# Patient Record
Sex: FEMALE | Race: WHITE | NOT HISPANIC OR LATINO
[De-identification: names, ages, dates, MRNs, and addresses within clinical notes are randomized per-mention and may not be internally consistent; named-entity substitution may affect disease eponyms.]

---

## 2017-02-23 ENCOUNTER — APPOINTMENT (OUTPATIENT)
Dept: FAMILY MEDICINE | Facility: CLINIC | Age: 82
End: 2017-02-23

## 2017-02-23 VITALS
SYSTOLIC BLOOD PRESSURE: 142 MMHG | HEIGHT: 61 IN | BODY MASS INDEX: 29.27 KG/M2 | WEIGHT: 155 LBS | DIASTOLIC BLOOD PRESSURE: 80 MMHG

## 2017-02-23 DIAGNOSIS — Z87.891 PERSONAL HISTORY OF NICOTINE DEPENDENCE: ICD-10-CM

## 2017-02-23 RX ORDER — MULTIVIT-MIN/FOLIC/VIT K/LYCOP 400-300MCG
25 MCG TABLET ORAL
Qty: 180 | Refills: 1 | Status: ACTIVE | COMMUNITY
Start: 2017-02-23

## 2017-02-23 RX ORDER — FEXOFENADINE HYDROCHLORIDE AND PSEUDOEPHEDRINE HYDROCHLORIDE 60; 120 MG/1; MG/1
60-120 TABLET, FILM COATED, EXTENDED RELEASE ORAL
Refills: 0 | Status: ACTIVE | COMMUNITY
Start: 2017-02-23

## 2017-02-24 LAB
ALBUMIN SERPL ELPH-MCNC: 4.3 G/DL
ALP BLD-CCNC: 99 U/L
ALT SERPL-CCNC: 17 U/L
ANION GAP SERPL CALC-SCNC: 18 MMOL/L
AST SERPL-CCNC: 18 U/L
BASOPHILS # BLD AUTO: 0.05 K/UL
BASOPHILS NFR BLD AUTO: 0.5 %
BILIRUB SERPL-MCNC: 0.3 MG/DL
BUN SERPL-MCNC: 14 MG/DL
CALCIUM SERPL-MCNC: 9.5 MG/DL
CHLORIDE SERPL-SCNC: 94 MMOL/L
CHOLEST SERPL-MCNC: 175 MG/DL
CHOLEST/HDLC SERPL: 3.3 RATIO
CO2 SERPL-SCNC: 23 MMOL/L
CREAT SERPL-MCNC: 1.18 MG/DL
EOSINOPHIL # BLD AUTO: 0.14 K/UL
EOSINOPHIL NFR BLD AUTO: 1.3 %
GLUCOSE SERPL-MCNC: 112 MG/DL
HCT VFR BLD CALC: 36.1 %
HDLC SERPL-MCNC: 53 MG/DL
HGB BLD-MCNC: 11.6 G/DL
IMM GRANULOCYTES NFR BLD AUTO: 0.2 %
LDLC SERPL CALC-MCNC: 87 MG/DL
LYMPHOCYTES # BLD AUTO: 2.31 K/UL
LYMPHOCYTES NFR BLD AUTO: 21.7 %
MAN DIFF?: NORMAL
MCHC RBC-ENTMCNC: 26.4 PG
MCHC RBC-ENTMCNC: 32.1 GM/DL
MCV RBC AUTO: 82 FL
MONOCYTES # BLD AUTO: 0.55 K/UL
MONOCYTES NFR BLD AUTO: 5.2 %
NEUTROPHILS # BLD AUTO: 7.56 K/UL
NEUTROPHILS NFR BLD AUTO: 71.1 %
PLATELET # BLD AUTO: 441 K/UL
POTASSIUM SERPL-SCNC: 4.2 MMOL/L
PROT SERPL-MCNC: 7 G/DL
RBC # BLD: 4.4 M/UL
RBC # FLD: 13.9 %
SODIUM SERPL-SCNC: 135 MMOL/L
TRIGL SERPL-MCNC: 176 MG/DL
TSH SERPL-ACNC: 2.62 UIU/ML
WBC # FLD AUTO: 10.63 K/UL

## 2017-03-02 ENCOUNTER — MEDICATION RENEWAL (OUTPATIENT)
Age: 82
End: 2017-03-02

## 2017-04-12 ENCOUNTER — APPOINTMENT (OUTPATIENT)
Dept: ORTHOPEDIC SURGERY | Facility: CLINIC | Age: 82
End: 2017-04-12

## 2017-05-22 ENCOUNTER — MEDICATION RENEWAL (OUTPATIENT)
Age: 82
End: 2017-05-22

## 2017-07-26 ENCOUNTER — APPOINTMENT (OUTPATIENT)
Dept: ORTHOPEDIC SURGERY | Facility: CLINIC | Age: 82
End: 2017-07-26

## 2017-07-26 VITALS — WEIGHT: 140 LBS | HEIGHT: 61 IN | BODY MASS INDEX: 26.43 KG/M2

## 2017-07-26 DIAGNOSIS — Z78.9 OTHER SPECIFIED HEALTH STATUS: ICD-10-CM

## 2017-07-26 DIAGNOSIS — Z87.39 PERSONAL HISTORY OF OTHER DISEASES OF THE MUSCULOSKELETAL SYSTEM AND CONNECTIVE TISSUE: ICD-10-CM

## 2017-07-26 DIAGNOSIS — M21.162 VARUS DEFORMITY, NOT ELSEWHERE CLASSIFIED, LEFT KNEE: ICD-10-CM

## 2017-07-26 DIAGNOSIS — Z82.61 FAMILY HISTORY OF ARTHRITIS: ICD-10-CM

## 2017-07-26 DIAGNOSIS — Z80.9 FAMILY HISTORY OF MALIGNANT NEOPLASM, UNSPECIFIED: ICD-10-CM

## 2017-07-26 DIAGNOSIS — Z86.39 PERSONAL HISTORY OF OTHER ENDOCRINE, NUTRITIONAL AND METABOLIC DISEASE: ICD-10-CM

## 2017-08-16 ENCOUNTER — OUTPATIENT (OUTPATIENT)
Dept: OUTPATIENT SERVICES | Facility: HOSPITAL | Age: 82
LOS: 1 days | Discharge: HOME | End: 2017-08-16
Payer: MEDICARE

## 2017-08-16 VITALS
DIASTOLIC BLOOD PRESSURE: 88 MMHG | TEMPERATURE: 98 F | SYSTOLIC BLOOD PRESSURE: 155 MMHG | HEIGHT: 61 IN | RESPIRATION RATE: 18 BRPM | HEART RATE: 81 BPM | OXYGEN SATURATION: 97 % | WEIGHT: 148.37 LBS

## 2017-08-16 DIAGNOSIS — M25.569 PAIN IN UNSPECIFIED KNEE: ICD-10-CM

## 2017-08-16 DIAGNOSIS — I10 ESSENTIAL (PRIMARY) HYPERTENSION: ICD-10-CM

## 2017-08-16 DIAGNOSIS — Z01.818 ENCOUNTER FOR OTHER PREPROCEDURAL EXAMINATION: ICD-10-CM

## 2017-08-16 DIAGNOSIS — M17.12 UNILATERAL PRIMARY OSTEOARTHRITIS, LEFT KNEE: ICD-10-CM

## 2017-08-16 LAB
ANION GAP SERPL CALC-SCNC: 10 MMOL/L — SIGNIFICANT CHANGE UP (ref 5–17)
APTT BLD: 28.9 SEC — SIGNIFICANT CHANGE UP (ref 27.5–37.4)
BASOPHILS # BLD AUTO: 0.1 K/UL — SIGNIFICANT CHANGE UP (ref 0–0.2)
BASOPHILS NFR BLD AUTO: 1.2 % — SIGNIFICANT CHANGE UP (ref 0–2)
BUN SERPL-MCNC: 21 MG/DL — SIGNIFICANT CHANGE UP (ref 7–23)
CALCIUM SERPL-MCNC: 9 MG/DL — SIGNIFICANT CHANGE UP (ref 8.5–10.1)
CHLORIDE SERPL-SCNC: 102 MMOL/L — SIGNIFICANT CHANGE UP (ref 96–108)
CO2 SERPL-SCNC: 26 MMOL/L — SIGNIFICANT CHANGE UP (ref 22–31)
CREAT SERPL-MCNC: 1.24 MG/DL — SIGNIFICANT CHANGE UP (ref 0.5–1.3)
EOSINOPHIL # BLD AUTO: 0.2 K/UL — SIGNIFICANT CHANGE UP (ref 0–0.5)
EOSINOPHIL NFR BLD AUTO: 3.1 % — SIGNIFICANT CHANGE UP (ref 0–6)
GLUCOSE SERPL-MCNC: 112 MG/DL — HIGH (ref 70–99)
HBA1C BLD-MCNC: 5.7 % — HIGH (ref 4–5.6)
HCT VFR BLD CALC: 37 % — SIGNIFICANT CHANGE UP (ref 34.5–45)
HGB BLD-MCNC: 12.1 G/DL — SIGNIFICANT CHANGE UP (ref 11.5–15.5)
INR BLD: 1.04 RATIO — SIGNIFICANT CHANGE UP (ref 0.88–1.16)
LYMPHOCYTES # BLD AUTO: 2.2 K/UL — SIGNIFICANT CHANGE UP (ref 1–3.3)
LYMPHOCYTES # BLD AUTO: 27.8 % — SIGNIFICANT CHANGE UP (ref 13–44)
MCHC RBC-ENTMCNC: 27.9 PG — SIGNIFICANT CHANGE UP (ref 27–34)
MCHC RBC-ENTMCNC: 32.8 GM/DL — SIGNIFICANT CHANGE UP (ref 32–36)
MCV RBC AUTO: 85.2 FL — SIGNIFICANT CHANGE UP (ref 80–100)
MONOCYTES # BLD AUTO: 0.4 K/UL — SIGNIFICANT CHANGE UP (ref 0–0.9)
MONOCYTES NFR BLD AUTO: 5.7 % — SIGNIFICANT CHANGE UP (ref 2–14)
MRSA PCR RESULT.: SIGNIFICANT CHANGE UP
NEUTROPHILS # BLD AUTO: 4.9 K/UL — SIGNIFICANT CHANGE UP (ref 1.8–7.4)
NEUTROPHILS NFR BLD AUTO: 62.2 % — SIGNIFICANT CHANGE UP (ref 43–77)
PLATELET # BLD AUTO: 325 K/UL — SIGNIFICANT CHANGE UP (ref 150–400)
POTASSIUM SERPL-MCNC: 3.7 MMOL/L — SIGNIFICANT CHANGE UP (ref 3.5–5.3)
POTASSIUM SERPL-SCNC: 3.7 MMOL/L — SIGNIFICANT CHANGE UP (ref 3.5–5.3)
PROTHROM AB SERPL-ACNC: 11.4 SEC — SIGNIFICANT CHANGE UP (ref 9.8–12.7)
RBC # BLD: 4.34 M/UL — SIGNIFICANT CHANGE UP (ref 3.8–5.2)
RBC # FLD: 12.4 % — SIGNIFICANT CHANGE UP (ref 11–15)
S AUREUS DNA NOSE QL NAA+PROBE: SIGNIFICANT CHANGE UP
SODIUM SERPL-SCNC: 138 MMOL/L — SIGNIFICANT CHANGE UP (ref 135–145)
WBC # BLD: 7.9 K/UL — SIGNIFICANT CHANGE UP (ref 3.8–10.5)
WBC # FLD AUTO: 7.9 K/UL — SIGNIFICANT CHANGE UP (ref 3.8–10.5)

## 2017-08-16 PROCEDURE — 93010 ELECTROCARDIOGRAM REPORT: CPT | Mod: NC

## 2017-08-16 NOTE — PHYSICAL THERAPY INITIAL EVALUATION ADULT - MODIFIED CLINICAL TEST OF SENSORY INTEGRATION IN BALANCE TEST
5X Sit To Stand Test: 12 seconds with quad cane, 2MWT: 205 Feet, Barthel Index: Feeding Score _10__, Bathing Score _5__, Grooming Score _5__, Dressing Score _10__, Bowels Score _10__, Bladder Score _10__, Toilet Score __10_, Transfers Score __15_, Mobility Score _15__, Stairs Score _10__,     Total Score _100__

## 2017-08-16 NOTE — PHYSICAL THERAPY INITIAL EVALUATION ADULT - CRITERIA FOR SKILLED THERAPEUTIC INTERVENTIONS
predicted duration of therapy intervention/Home with Home P.T./rehab potential/impairments found/therapy frequency/anticipated equipment needs at discharge/functional limitations in following categories/anticipated discharge recommendation/risk reduction/prevention

## 2017-08-16 NOTE — H&P PST ADULT - VISION (WITH CORRECTIVE LENSES IF THE PATIENT USUALLY WEARS THEM):
Use Glasses/Partially impaired: cannot see medication labels or newsprint, but can see obstacles in path, and the surrounding layout; can count fingers at arm's length

## 2017-08-16 NOTE — PHYSICAL THERAPY INITIAL EVALUATION ADULT - ADDITIONAL COMMENTS
Patient utilizes a narrow base quad cane for home bound ambulation and for community ambulation patient utilizes a rolling walker.

## 2017-08-16 NOTE — PHYSICAL THERAPY INITIAL EVALUATION ADULT - LIVES WITH, PROFILE
Private house with 1 platform step to enter the home and 17 steps to enter with L handrail to enter the second level where patient resides./children

## 2017-08-16 NOTE — OCCUPATIONAL THERAPY INITIAL EVALUATION ADULT - GENERAL OBSERVATIONS, REHAB EVAL
Chart reviewed. Patient encountered seated in recliner chair in [facility] waiting area with NAD. Patient underwent occupational therapy pre-operative consultation to determine current functional ADL limitations in order to provide the right equipment for patient to perform functional ADLS post operation.

## 2017-08-16 NOTE — OCCUPATIONAL THERAPY INITIAL EVALUATION ADULT - ADDITIONAL COMMENTS
Patient lives in a 2 family home with 2 steps with no railing to enter. Once inside, the patient has 5 steps with a railing to a landing, then another 5 steps with a railing to another landing, then another 5 steps with a railing to the main floor where the bedroom and bathroom is. The patient has a regular bathroom with a low toilet and a shower stall with no devices or equipment inside. The patient ambulates with a rolling walker inside the house and outside of the house with a quad cane. The patient is able to state wants and needs at this time.

## 2017-08-16 NOTE — H&P PST ADULT - HISTORY OF PRESENT ILLNESS
82 yo female c/o bilateral knee pains secondary to osteoarthritis- left is worse- scheduled for left knee arthroplasty

## 2017-08-16 NOTE — H&P PST ADULT - NSANTHOSAYNRD_GEN_A_CORE
No. COLE screening performed.  STOP BANG Legend: 0-2 = LOW Risk; 3-4 = INTERMEDIATE Risk; 5-8 = HIGH Risk/denies

## 2017-08-16 NOTE — OCCUPATIONAL THERAPY INITIAL EVALUATION ADULT - MODIFIED CLINICAL TEST OF SENSORY INTEGRATION IN BALANCE TEST
Barthel Index: Feeding Score___10___, Bathing Score___5___, Grooming Score__5___, Dressing Score__10___, Bowel Score__10___, Bladder Score__10____, Toilet Score__10___, Transfer Score___15___, Mobility Score___15__, Stairs Score__10___, Total Score__100___.

## 2017-08-16 NOTE — OCCUPATIONAL THERAPY INITIAL EVALUATION ADULT - SOCIAL CONCERNS
As per patient " My son in law will be home and he will be able to help me with any of my daily tasks and needs." "I don't have any barriers in my home as well"./None

## 2017-08-16 NOTE — OCCUPATIONAL THERAPY INITIAL EVALUATION ADULT - FINE MOTOR COORDINATION, FINE MOTOR COORDINATION TESTS, OT EVAL
Patient specific functional scale: Scoring scheme for test: 0 (unable to perform activity) ---- 10 (Able to perform activity at the same level as before injury or problem ). Activity: Walking____4______, Cleaning___4___, Stairs____4____

## 2017-08-16 NOTE — PATIENT PROFILE ADULT. - VISION (WITH CORRECTIVE LENSES IF THE PATIENT USUALLY WEARS THEM):
Partially impaired: cannot see medication labels or newsprint, but can see obstacles in path, and the surrounding layout; can count fingers at arm's length/Use Glasses

## 2017-08-21 ENCOUNTER — APPOINTMENT (OUTPATIENT)
Dept: FAMILY MEDICINE | Facility: CLINIC | Age: 82
End: 2017-08-21
Payer: MEDICARE

## 2017-08-21 ENCOUNTER — MEDICATION RENEWAL (OUTPATIENT)
Age: 82
End: 2017-08-21

## 2017-08-21 VITALS
WEIGHT: 140 LBS | HEIGHT: 61 IN | BODY MASS INDEX: 26.43 KG/M2 | SYSTOLIC BLOOD PRESSURE: 130 MMHG | DIASTOLIC BLOOD PRESSURE: 80 MMHG

## 2017-08-21 DIAGNOSIS — M25.562 PAIN IN RIGHT KNEE: ICD-10-CM

## 2017-08-21 DIAGNOSIS — M25.561 PAIN IN RIGHT KNEE: ICD-10-CM

## 2017-08-21 PROCEDURE — 99214 OFFICE O/P EST MOD 30 MIN: CPT

## 2017-08-29 RX ORDER — SODIUM CHLORIDE 9 MG/ML
3 INJECTION INTRAMUSCULAR; INTRAVENOUS; SUBCUTANEOUS EVERY 8 HOURS
Qty: 0 | Refills: 0 | Status: DISCONTINUED | OUTPATIENT
Start: 2017-08-31 | End: 2017-09-03

## 2017-08-30 RX ORDER — ONDANSETRON 8 MG/1
8 TABLET, FILM COATED ORAL EVERY 6 HOURS
Qty: 0 | Refills: 0 | Status: DISCONTINUED | OUTPATIENT
Start: 2017-08-31 | End: 2017-09-03

## 2017-08-30 RX ORDER — OXYCODONE HYDROCHLORIDE 5 MG/1
10 TABLET ORAL ONCE
Qty: 0 | Refills: 0 | Status: DISCONTINUED | OUTPATIENT
Start: 2017-08-31 | End: 2017-08-31

## 2017-08-30 RX ORDER — CELECOXIB 200 MG/1
200 CAPSULE ORAL ONCE
Qty: 0 | Refills: 0 | Status: COMPLETED | OUTPATIENT
Start: 2017-08-31 | End: 2017-09-01

## 2017-08-30 RX ORDER — LORATADINE 10 MG/1
10 TABLET ORAL DAILY
Qty: 0 | Refills: 0 | Status: DISCONTINUED | OUTPATIENT
Start: 2017-08-31 | End: 2017-09-03

## 2017-08-30 RX ORDER — MAGNESIUM HYDROXIDE 400 MG/1
30 TABLET, CHEWABLE ORAL DAILY
Qty: 0 | Refills: 0 | Status: DISCONTINUED | OUTPATIENT
Start: 2017-08-31 | End: 2017-09-03

## 2017-08-30 RX ORDER — FERROUS SULFATE 325(65) MG
325 TABLET ORAL
Qty: 0 | Refills: 0 | Status: DISCONTINUED | OUTPATIENT
Start: 2017-08-31 | End: 2017-09-03

## 2017-08-30 RX ORDER — METOCLOPRAMIDE HCL 10 MG
10 TABLET ORAL EVERY 6 HOURS
Qty: 0 | Refills: 0 | Status: DISCONTINUED | OUTPATIENT
Start: 2017-08-31 | End: 2017-09-03

## 2017-08-30 RX ORDER — ENOXAPARIN SODIUM 100 MG/ML
40 INJECTION SUBCUTANEOUS EVERY 24 HOURS
Qty: 0 | Refills: 0 | Status: DISCONTINUED | OUTPATIENT
Start: 2017-09-01 | End: 2017-09-03

## 2017-08-30 RX ORDER — HYDROMORPHONE HYDROCHLORIDE 2 MG/ML
0.5 INJECTION INTRAMUSCULAR; INTRAVENOUS; SUBCUTANEOUS EVERY 4 HOURS
Qty: 0 | Refills: 0 | Status: DISCONTINUED | OUTPATIENT
Start: 2017-08-31 | End: 2017-09-03

## 2017-08-30 RX ORDER — OXYCODONE HYDROCHLORIDE 5 MG/1
10 TABLET ORAL EVERY 4 HOURS
Qty: 0 | Refills: 0 | Status: DISCONTINUED | OUTPATIENT
Start: 2017-08-31 | End: 2017-09-03

## 2017-08-30 RX ORDER — OXYCODONE HYDROCHLORIDE 5 MG/1
5 TABLET ORAL EVERY 4 HOURS
Qty: 0 | Refills: 0 | Status: DISCONTINUED | OUTPATIENT
Start: 2017-08-31 | End: 2017-09-03

## 2017-08-30 RX ORDER — LOSARTAN POTASSIUM 100 MG/1
100 TABLET, FILM COATED ORAL DAILY
Qty: 0 | Refills: 0 | Status: DISCONTINUED | OUTPATIENT
Start: 2017-08-31 | End: 2017-09-03

## 2017-08-30 RX ORDER — HYDROCHLOROTHIAZIDE 25 MG
25 TABLET ORAL DAILY
Qty: 0 | Refills: 0 | Status: DISCONTINUED | OUTPATIENT
Start: 2017-09-01 | End: 2017-09-03

## 2017-08-30 RX ORDER — ACETAMINOPHEN 500 MG
650 TABLET ORAL ONCE
Qty: 0 | Refills: 0 | Status: DISCONTINUED | OUTPATIENT
Start: 2017-08-31 | End: 2017-09-03

## 2017-08-30 RX ORDER — SODIUM CHLORIDE 9 MG/ML
80 INJECTION, SOLUTION INTRAVENOUS
Qty: 0 | Refills: 0 | Status: DISCONTINUED | OUTPATIENT
Start: 2017-08-31 | End: 2017-09-01

## 2017-08-30 RX ORDER — ACETAMINOPHEN 500 MG
650 TABLET ORAL EVERY 6 HOURS
Qty: 0 | Refills: 0 | Status: DISCONTINUED | OUTPATIENT
Start: 2017-08-31 | End: 2017-09-03

## 2017-08-30 RX ORDER — DOCUSATE SODIUM 100 MG
100 CAPSULE ORAL THREE TIMES A DAY
Qty: 0 | Refills: 0 | Status: DISCONTINUED | OUTPATIENT
Start: 2017-08-31 | End: 2017-09-03

## 2017-08-30 RX ORDER — SENNA PLUS 8.6 MG/1
2 TABLET ORAL AT BEDTIME
Qty: 0 | Refills: 0 | Status: DISCONTINUED | OUTPATIENT
Start: 2017-08-31 | End: 2017-09-03

## 2017-08-31 ENCOUNTER — RESULT REVIEW (OUTPATIENT)
Age: 82
End: 2017-08-31

## 2017-08-31 ENCOUNTER — APPOINTMENT (OUTPATIENT)
Dept: ORTHOPEDIC SURGERY | Facility: HOSPITAL | Age: 82
End: 2017-08-31

## 2017-08-31 ENCOUNTER — INPATIENT (INPATIENT)
Facility: HOSPITAL | Age: 82
LOS: 2 days | Discharge: HOME HEALTH | End: 2017-09-03
Attending: ORTHOPAEDIC SURGERY | Admitting: ORTHOPAEDIC SURGERY
Payer: MEDICARE

## 2017-08-31 VITALS
HEART RATE: 71 BPM | SYSTOLIC BLOOD PRESSURE: 129 MMHG | RESPIRATION RATE: 18 BRPM | OXYGEN SATURATION: 96 % | WEIGHT: 145.06 LBS | TEMPERATURE: 98 F | HEIGHT: 61 IN | DIASTOLIC BLOOD PRESSURE: 76 MMHG

## 2017-08-31 LAB
ANION GAP SERPL CALC-SCNC: 11 MMOL/L — SIGNIFICANT CHANGE UP (ref 5–17)
BUN SERPL-MCNC: 22 MG/DL — SIGNIFICANT CHANGE UP (ref 7–23)
CALCIUM SERPL-MCNC: 9 MG/DL — SIGNIFICANT CHANGE UP (ref 8.5–10.1)
CHLORIDE SERPL-SCNC: 103 MMOL/L — SIGNIFICANT CHANGE UP (ref 96–108)
CO2 SERPL-SCNC: 24 MMOL/L — SIGNIFICANT CHANGE UP (ref 22–31)
CREAT SERPL-MCNC: 1.5 MG/DL — HIGH (ref 0.5–1.3)
GLUCOSE SERPL-MCNC: 143 MG/DL — HIGH (ref 70–99)
HCT VFR BLD CALC: 36 % — SIGNIFICANT CHANGE UP (ref 34.5–45)
HGB BLD-MCNC: 11.7 G/DL — SIGNIFICANT CHANGE UP (ref 11.5–15.5)
INR BLD: 1.08 RATIO — SIGNIFICANT CHANGE UP (ref 0.88–1.16)
MCHC RBC-ENTMCNC: 28.4 PG — SIGNIFICANT CHANGE UP (ref 27–34)
MCHC RBC-ENTMCNC: 32.5 GM/DL — SIGNIFICANT CHANGE UP (ref 32–36)
MCV RBC AUTO: 87.2 FL — SIGNIFICANT CHANGE UP (ref 80–100)
PLATELET # BLD AUTO: 348 K/UL — SIGNIFICANT CHANGE UP (ref 150–400)
POTASSIUM SERPL-MCNC: 4 MMOL/L — SIGNIFICANT CHANGE UP (ref 3.5–5.3)
POTASSIUM SERPL-SCNC: 4 MMOL/L — SIGNIFICANT CHANGE UP (ref 3.5–5.3)
PROTHROM AB SERPL-ACNC: 11.8 SEC — SIGNIFICANT CHANGE UP (ref 9.8–12.7)
RBC # BLD: 4.13 M/UL — SIGNIFICANT CHANGE UP (ref 3.8–5.2)
RBC # FLD: 13.4 % — SIGNIFICANT CHANGE UP (ref 11–15)
SODIUM SERPL-SCNC: 138 MMOL/L — SIGNIFICANT CHANGE UP (ref 135–145)
WBC # BLD: 13.2 K/UL — HIGH (ref 3.8–10.5)
WBC # FLD AUTO: 13.2 K/UL — HIGH (ref 3.8–10.5)

## 2017-08-31 PROCEDURE — 88305 TISSUE EXAM BY PATHOLOGIST: CPT | Mod: 26

## 2017-08-31 PROCEDURE — 88311 DECALCIFY TISSUE: CPT | Mod: 26

## 2017-08-31 PROCEDURE — 27447 TOTAL KNEE ARTHROPLASTY: CPT | Mod: LT

## 2017-08-31 PROCEDURE — 73560 X-RAY EXAM OF KNEE 1 OR 2: CPT | Mod: 26,LT

## 2017-08-31 RX ORDER — CEFAZOLIN SODIUM 1 G
2000 VIAL (EA) INJECTION EVERY 8 HOURS
Qty: 0 | Refills: 0 | Status: COMPLETED | OUTPATIENT
Start: 2017-08-31 | End: 2017-08-31

## 2017-08-31 RX ORDER — ACETAMINOPHEN 500 MG
1000 TABLET ORAL ONCE
Qty: 0 | Refills: 0 | Status: COMPLETED | OUTPATIENT
Start: 2017-08-31 | End: 2017-09-01

## 2017-08-31 RX ORDER — ONDANSETRON 8 MG/1
4 TABLET, FILM COATED ORAL ONCE
Qty: 0 | Refills: 0 | Status: DISCONTINUED | OUTPATIENT
Start: 2017-08-31 | End: 2017-08-31

## 2017-08-31 RX ORDER — HYDROMORPHONE HYDROCHLORIDE 2 MG/ML
0.5 INJECTION INTRAMUSCULAR; INTRAVENOUS; SUBCUTANEOUS
Qty: 0 | Refills: 0 | Status: DISCONTINUED | OUTPATIENT
Start: 2017-08-31 | End: 2017-08-31

## 2017-08-31 RX ORDER — SODIUM CHLORIDE 9 MG/ML
1000 INJECTION, SOLUTION INTRAVENOUS
Qty: 0 | Refills: 0 | Status: DISCONTINUED | OUTPATIENT
Start: 2017-08-31 | End: 2017-08-31

## 2017-08-31 RX ORDER — ACETAMINOPHEN 500 MG
1000 TABLET ORAL ONCE
Qty: 0 | Refills: 0 | Status: COMPLETED | OUTPATIENT
Start: 2017-08-31 | End: 2017-08-31

## 2017-08-31 RX ADMIN — Medication 400 MILLIGRAM(S): at 14:14

## 2017-08-31 RX ADMIN — Medication 1000 MILLIGRAM(S): at 15:15

## 2017-08-31 RX ADMIN — Medication 100 MILLIGRAM(S): at 17:53

## 2017-08-31 RX ADMIN — Medication 100 MILLIGRAM(S): at 22:42

## 2017-08-31 RX ADMIN — SODIUM CHLORIDE 3 MILLILITER(S): 9 INJECTION INTRAMUSCULAR; INTRAVENOUS; SUBCUTANEOUS at 22:41

## 2017-08-31 RX ADMIN — Medication 325 MILLIGRAM(S): at 17:33

## 2017-08-31 RX ADMIN — HYDROMORPHONE HYDROCHLORIDE 0.5 MILLIGRAM(S): 2 INJECTION INTRAMUSCULAR; INTRAVENOUS; SUBCUTANEOUS at 17:46

## 2017-08-31 RX ADMIN — HYDROMORPHONE HYDROCHLORIDE 0.5 MILLIGRAM(S): 2 INJECTION INTRAMUSCULAR; INTRAVENOUS; SUBCUTANEOUS at 17:28

## 2017-08-31 RX ADMIN — OXYCODONE HYDROCHLORIDE 10 MILLIGRAM(S): 5 TABLET ORAL at 09:36

## 2017-08-31 RX ADMIN — SODIUM CHLORIDE 75 MILLILITER(S): 9 INJECTION, SOLUTION INTRAVENOUS at 14:15

## 2017-08-31 NOTE — PHYSICAL THERAPY INITIAL EVALUATION ADULT - GENERAL OBSERVATIONS, REHAB EVAL
Pt. found supine, awake with dressing  left knee, ace wrap and hemovac, c/o pain and tightness L knee.

## 2017-08-31 NOTE — PHYSICAL THERAPY INITIAL EVALUATION ADULT - IMPAIRMENTS FOUND, PT EVAL
muscle strength/gait, locomotion, and balance/aerobic capacity/endurance/ergonomics and body mechanics

## 2017-08-31 NOTE — PHYSICAL THERAPY INITIAL EVALUATION ADULT - CRITERIA FOR SKILLED THERAPEUTIC INTERVENTIONS
functional limitations in following categories/risk reduction/prevention/anticipated equipment needs at discharge/impairments found

## 2017-08-31 NOTE — PROGRESS NOTE ADULT - SUBJECTIVE AND OBJECTIVE BOX
Post Operative Check    82y Female s/p L TKA Seen in PACU  Pain controlled  Unable to assess Sensation/Motor 2/2 block, will reassess  Dressing C/D/I  No calf ttp  Drain in place     Vital Signs Last 24 Hrs  T(C): 36.3 (31 Aug 2017 13:22), Max: 36.7 (31 Aug 2017 08:04)  T(F): 97.3 (31 Aug 2017 13:22), Max: 98 (31 Aug 2017 08:04)  HR: 81 (31 Aug 2017 13:52) (71 - 88)  BP: 113/71 (31 Aug 2017 13:52) (113/71 - 133/55)  BP(mean): --  RR: 20 (31 Aug 2017 13:52) (14 - 20)  SpO2: 95% (31 Aug 2017 13:52) (95% - 100%)    WBAT, FU Labs, Tx to floor

## 2017-09-01 ENCOUNTER — TRANSCRIPTION ENCOUNTER (OUTPATIENT)
Age: 82
End: 2017-09-01

## 2017-09-01 LAB
ALBUMIN SERPL ELPH-MCNC: 3.5 G/DL — SIGNIFICANT CHANGE UP (ref 3.3–5)
ALP SERPL-CCNC: 97 U/L — SIGNIFICANT CHANGE UP (ref 40–120)
ALT FLD-CCNC: 14 U/L — SIGNIFICANT CHANGE UP (ref 12–78)
ANION GAP SERPL CALC-SCNC: 13 MMOL/L — SIGNIFICANT CHANGE UP (ref 5–17)
AST SERPL-CCNC: 22 U/L — SIGNIFICANT CHANGE UP (ref 15–37)
BILIRUB SERPL-MCNC: 0.5 MG/DL — SIGNIFICANT CHANGE UP (ref 0.2–1.2)
BUN SERPL-MCNC: 17 MG/DL — SIGNIFICANT CHANGE UP (ref 7–23)
CALCIUM SERPL-MCNC: 8.8 MG/DL — SIGNIFICANT CHANGE UP (ref 8.5–10.1)
CHLORIDE SERPL-SCNC: 99 MMOL/L — SIGNIFICANT CHANGE UP (ref 96–108)
CO2 SERPL-SCNC: 24 MMOL/L — SIGNIFICANT CHANGE UP (ref 22–31)
CREAT SERPL-MCNC: 1.18 MG/DL — SIGNIFICANT CHANGE UP (ref 0.5–1.3)
GLUCOSE SERPL-MCNC: 137 MG/DL — HIGH (ref 70–99)
HCT VFR BLD CALC: 33.5 % — LOW (ref 34.5–45)
HGB BLD-MCNC: 11.4 G/DL — LOW (ref 11.5–15.5)
MCHC RBC-ENTMCNC: 28.6 PG — SIGNIFICANT CHANGE UP (ref 27–34)
MCHC RBC-ENTMCNC: 33.9 GM/DL — SIGNIFICANT CHANGE UP (ref 32–36)
MCV RBC AUTO: 84.3 FL — SIGNIFICANT CHANGE UP (ref 80–100)
PLATELET # BLD AUTO: 313 K/UL — SIGNIFICANT CHANGE UP (ref 150–400)
POTASSIUM SERPL-MCNC: 4.1 MMOL/L — SIGNIFICANT CHANGE UP (ref 3.5–5.3)
POTASSIUM SERPL-SCNC: 4.1 MMOL/L — SIGNIFICANT CHANGE UP (ref 3.5–5.3)
PROT SERPL-MCNC: 7.4 GM/DL — SIGNIFICANT CHANGE UP (ref 6–8.3)
RBC # BLD: 3.98 M/UL — SIGNIFICANT CHANGE UP (ref 3.8–5.2)
RBC # FLD: 13.1 % — SIGNIFICANT CHANGE UP (ref 11–15)
SODIUM SERPL-SCNC: 136 MMOL/L — SIGNIFICANT CHANGE UP (ref 135–145)
WBC # BLD: 15.7 K/UL — HIGH (ref 3.8–10.5)
WBC # FLD AUTO: 15.7 K/UL — HIGH (ref 3.8–10.5)

## 2017-09-01 RX ORDER — ENOXAPARIN SODIUM 100 MG/ML
40 INJECTION SUBCUTANEOUS
Qty: 14 | Refills: 0 | OUTPATIENT
Start: 2017-09-01 | End: 2017-09-15

## 2017-09-01 RX ORDER — TRAMADOL HYDROCHLORIDE 50 MG/1
50 TABLET ORAL ONCE
Qty: 0 | Refills: 0 | Status: DISCONTINUED | OUTPATIENT
Start: 2017-09-01 | End: 2017-09-01

## 2017-09-01 RX ORDER — TRAMADOL HYDROCHLORIDE 50 MG/1
25 TABLET ORAL EVERY 6 HOURS
Qty: 0 | Refills: 0 | Status: DISCONTINUED | OUTPATIENT
Start: 2017-09-01 | End: 2017-09-01

## 2017-09-01 RX ORDER — ACETAMINOPHEN 500 MG
2 TABLET ORAL
Qty: 0 | Refills: 0 | COMMUNITY

## 2017-09-01 RX ORDER — ASPIRIN/CALCIUM CARB/MAGNESIUM 324 MG
1 TABLET ORAL
Qty: 60 | Refills: 0 | OUTPATIENT
Start: 2017-09-01 | End: 2017-10-01

## 2017-09-01 RX ADMIN — Medication 325 MILLIGRAM(S): at 08:37

## 2017-09-01 RX ADMIN — CELECOXIB 200 MILLIGRAM(S): 200 CAPSULE ORAL at 05:16

## 2017-09-01 RX ADMIN — Medication 400 MILLIGRAM(S): at 08:37

## 2017-09-01 RX ADMIN — Medication 30 MILLILITER(S): at 22:59

## 2017-09-01 RX ADMIN — ENOXAPARIN SODIUM 40 MILLIGRAM(S): 100 INJECTION SUBCUTANEOUS at 08:37

## 2017-09-01 RX ADMIN — TRAMADOL HYDROCHLORIDE 50 MILLIGRAM(S): 50 TABLET ORAL at 12:25

## 2017-09-01 RX ADMIN — SODIUM CHLORIDE 3 MILLILITER(S): 9 INJECTION INTRAMUSCULAR; INTRAVENOUS; SUBCUTANEOUS at 05:17

## 2017-09-01 RX ADMIN — Medication 1 TABLET(S): at 21:35

## 2017-09-01 RX ADMIN — Medication 30 MILLILITER(S): at 14:27

## 2017-09-01 RX ADMIN — LOSARTAN POTASSIUM 100 MILLIGRAM(S): 100 TABLET, FILM COATED ORAL at 05:18

## 2017-09-01 RX ADMIN — Medication 1 TABLET(S): at 05:17

## 2017-09-01 RX ADMIN — SODIUM CHLORIDE 3 MILLILITER(S): 9 INJECTION INTRAMUSCULAR; INTRAVENOUS; SUBCUTANEOUS at 21:36

## 2017-09-01 RX ADMIN — SODIUM CHLORIDE 3 MILLILITER(S): 9 INJECTION INTRAMUSCULAR; INTRAVENOUS; SUBCUTANEOUS at 14:24

## 2017-09-01 RX ADMIN — Medication 325 MILLIGRAM(S): at 17:19

## 2017-09-01 RX ADMIN — Medication 1000 MILLIGRAM(S): at 10:00

## 2017-09-01 RX ADMIN — Medication 100 MILLIGRAM(S): at 05:17

## 2017-09-01 RX ADMIN — TRAMADOL HYDROCHLORIDE 50 MILLIGRAM(S): 50 TABLET ORAL at 13:15

## 2017-09-01 RX ADMIN — HYDROMORPHONE HYDROCHLORIDE 0.5 MILLIGRAM(S): 2 INJECTION INTRAMUSCULAR; INTRAVENOUS; SUBCUTANEOUS at 16:13

## 2017-09-01 RX ADMIN — HYDROMORPHONE HYDROCHLORIDE 0.5 MILLIGRAM(S): 2 INJECTION INTRAMUSCULAR; INTRAVENOUS; SUBCUTANEOUS at 16:30

## 2017-09-01 RX ADMIN — CELECOXIB 200 MILLIGRAM(S): 200 CAPSULE ORAL at 06:16

## 2017-09-01 NOTE — OCCUPATIONAL THERAPY INITIAL EVALUATION ADULT - PERSONAL SAFETY AND JUDGMENT, REHAB EVAL
at risk behaviors demonstrated/pt  needed verbal cues for proper  hand and foot  placements during transfer/intact

## 2017-09-01 NOTE — DISCHARGE NOTE ADULT - HOSPITAL COURSE
The patient is an 82 year old female status post elective total knee Arthroplasty to the left knee after failing outpatient nonoperative conservative management.  Patient presented to [facility] after being medically cleared for an elective surgical procedure. The patient was taken to the operating room on date mentioned above. Prophylactic antibiotics were started before the procedure and continued for 24 hours.  There were no complications during the procedure and patient tolerated the procedure well.  The patient was transferred to recovery room in stable condition and subsequently to surgical floor.  Patient was placed Lovenox  for anticoagulation.  All home medications were continued.  The patient received physical therapy daily and daily labs were followed.  The dressing was kept clean, dry, intact and changed appropriately.  The rest of the hospital stay was unremarkable.

## 2017-09-01 NOTE — OCCUPATIONAL THERAPY INITIAL EVALUATION ADULT - GENERAL OBSERVATIONS, REHAB EVAL
Pt was seen for initial OT consult, encountered OOB to chair,  AA&Ox4, cooperative & followed commands. Pt presents with c/o pain , stiffness and decreased ROM in left knee due to s/p TKR; these  deficits limit pt's endurance, balance, performance with ADL  management & functional mobility; Pt was seen for initial OT consult, encountered OOB to chair,  AA&Ox4, cooperative & followed commands. Left  knee dressing is clean dry and intact.  Pt presents with c/o pain, stiffness and decreased ROM in left knee due to s/p TKR; these  deficits limit pt's endurance, balance, performance with ADL  management & functional mobility;

## 2017-09-01 NOTE — OCCUPATIONAL THERAPY INITIAL EVALUATION ADULT - ANTICIPATED DISCHARGE DISPOSITION, OT EVAL
Recommend home with OT referral  pending pt improvements with  ADL , funtional mobility and pt's ability to safely and independently  negotiate 17 steps with PT./home w/ OT

## 2017-09-01 NOTE — OCCUPATIONAL THERAPY INITIAL EVALUATION ADULT - PLANNED THERAPY INTERVENTIONS, OT EVAL
bed mobility training/ADL retraining/motor coordination training/transfer training/joint mobilization/neuromuscular re-education/balance training/IADL retraining/parent/caregiver training.../strengthening/stretching/ROM/energy conservation techniques

## 2017-09-01 NOTE — DISCHARGE NOTE ADULT - MEDICATION SUMMARY - MEDICATIONS TO STOP TAKING
I will STOP taking the medications listed below when I get home from the hospital:    Aleve  -- 2 tab(s) by mouth prn    Tylenol Arthritis Caplet  -- 2 tab(s) by mouth prn

## 2017-09-01 NOTE — OCCUPATIONAL THERAPY INITIAL EVALUATION ADULT - ADDITIONAL COMMENTS
Prior to admission, pt was functioning in her roles, self sufficient & ambulating independently with a quad cane and rolling walker;  pt  does not drive ;  presently , pt  needs assistance with  functional mobility and to  complete self care tasks The scale below depicts a picture of the pt's current level of functioning. Barthel Index: Feeding Score__10____, Bathing Score____0__, Grooming Score___0__, Dressing Score_5____, Bowel Score_10___, Bladder Score____5__, Toilet Score_5____, Transfer Score____5__, Mobility Score___0__, Stairs Score___0__, Total Score__40/100__.

## 2017-09-01 NOTE — CONSULT NOTE ADULT - SUBJECTIVE AND OBJECTIVE BOX
Chief Complaint:  Patient is a 82y old  Female who presents with a chief complaint of I have knee pains (31 Aug 2017 08:14)      HPI:  Currently no sob or chest pain . No palpitation . Had nausea and vomiting with oxycodone . No cough or fever . Voiding .  Did  participate in PT .  On Lovenox .      Review of Systems:    General:  No wt loss, fevers, chills, night sweats  Eyes:  Good vision, no reported pain  ENT:  No sore throat, pain, runny nose, dysphagia  CV:  No pain, palpitations , hypo/hypertension  Resp:  No dyspnea, cough, tachypnea, wheezing  GI:  No pain, nausea, vomiting, diarrhea .  :  No pain, bleeding, incontinence, nocturia  Muscle:  No pain, weakness  Breast:  No pain, abscess, mass, discharge  Neuro:  No weakness, tingling, memory problems  Psych:  No fatigue, insomnia, mood problems, depression  Endocrine:  No polyuria, polydypsia , cold/heat intolerance  Heme:  No petechiae, ecchymosis, easy bruisability  Skin:  No rash, tattoos, scars, edema    Relevant Family History: Noncontributery    Relevant Social History:  Quit smoking 15 years ago .    PMH : HTN . Meds : Hyzaar . Allergy NKDA .    Physical Exam:      Vital Signs:  Vital Signs Last 24 Hrs  T(C): 36.6 (01 Sep 2017 04:22), Max: 36.7 (31 Aug 2017 16:10)  T(F): 97.9 (01 Sep 2017 04:22), Max: 98 (31 Aug 2017 16:10)  HR: 79 (01 Sep 2017 04:22) (72 - 96)  BP: 135/80 (01 Sep 2017 04:22) (113/71 - 140/87)  BP(mean): --  RR: 18 (01 Sep 2017 04:22) (13 - 20)  SpO2: 99% (01 Sep 2017 04:22) (92% - 100%)  Daily     Daily   I&O's Summary    31 Aug 2017 07:01  -  01 Sep 2017 07:00  --------------------------------------------------------  IN: 460 mL / OUT: 30 mL / NET: 430 mL        General:  Appears stated age, well-groomed, well-nourished, no distress  HEENT:  NC/AT, patent nares w/ pink mucosa, OP clear w/o lesions, PERRL, EOMI, conjunctivae clear, no thyromegaly, nodules, adenopathy, no JVD  Chest:  Full & symmetric excursion, no increased effort, breath sounds clear  Cardiovascular:  Regular rhythm, S1, S2, no murmur/rub/S3/S4, no carotid/femoral/abdominal bruit, radial/pedal pulses 2+ .  Abdomen:  Soft, non-tender, non-distended, normoactive bowel sounds, no HSM  Extremities:  + pulse . Trace edema Left leg .  Skin:  No rash/erythema/ecchymoses/petechiae/wounds/abscess/warm/dry  Musculoskeletal: Intact .  Neuro/Psych:  Alert, oriented, normal and symmetric strength in UEs, LEs .    Laboratory:                            11.4   15.7  )-----------( 313      ( 01 Sep 2017 08:20 )             33.5     09-01    136  |  99  |  17  ----------------------------<  137<H>  4.1   |  24  |  1.18    Ca    8.8      01 Sep 2017 08:20    TPro  7.4  /  Alb  3.5  /  TBili  0.5  /  DBili  x   /  AST  22  /  ALT  14  /  AlkPhos  97  09-01          CAPILLARY BLOOD GLUCOSE        LIVER FUNCTIONS - ( 01 Sep 2017 08:20 )  Alb: 3.5 g/dL / Pro: 7.4 gm/dL / ALK PHOS: 97 U/L / ALT: 14 U/L / AST: 22 U/L / GGT: x           PT/INR - ( 31 Aug 2017 13:49 )   PT: 11.8 sec;   INR: 1.08 ratio               Imaging:      Assessment: S/P left knee replacement . HTN .      Plan:   PT/OT . Pain control  . Narcotic induced vomiting . Incentive spirometry . DVT prophylaxsis . Hyzaar . D/W the patient .

## 2017-09-01 NOTE — DISCHARGE NOTE ADULT - CARE PROVIDER_API CALL
Paul Basurto (MD), Orthopaedic Surgery  [address]  [address]th [place] [address]  Phone: [phone]  Fax: [phone]

## 2017-09-01 NOTE — DISCHARGE NOTE ADULT - PATIENT PORTAL LINK FT
“You can access the [facility] Patient Portal, offered by [facility], by registering with the following website: [email or web address]”

## 2017-09-01 NOTE — DISCHARGE NOTE ADULT - CARE PLAN
Principal Discharge DX:	Knee osteoarthritis  Goal:	return to baseline ADLs  Instructions for follow-up, activity and diet:	1.	Pain Control  2.	Walking with full weight bearing as tolerated, with assistive devices (walker/Cane as Needed)  3.	DVT Prophylaxis, Lovenox 40mg subcutaneous daily for 14 days. Day 15 start Aspirin 325 mg twice a day for 30 days. do not skip doses.  4.	PT as needed  5.	Follow up with Dr. Basurto as Outpatient in 10-14 Days after Discharge from the Hospital or Rehab. Call Office For Appointment. [phone]  6.	Remove Staples Post-Op Day 21, and Remove Dressing Post-Op Day 10, with Daily Dressing Changes as Need.  7.	Ice/Elevate affected area as Needed  8.	Keep Dressing Clean and dry. Principal Discharge DX:	Knee osteoarthritis  Goal:	return to baseline ADLs  Instructions for follow-up, activity and diet:	1.	Pain Control  2.	Walking with full weight bearing as tolerated, with assistive devices (walker/Cane as Needed)  3.	DVT Prophylaxis, Lovenox 40mg subcutaneous daily for 14 days. Day 15 start Aspirin 325 mg twice a day for 30 days. do not skip doses.  4.	PT as needed  5.	Follow up with Dr. Basurto as Outpatient in 10-14 Days after Discharge from the Hospital or Rehab. Call Office For Appointment. [phone]  6.	Remove Staples Post-Op Day 21, and Remove Dressing Post-Op Day 10, with Daily Dressing Changes as Need.  7.	Ice/Elevate affected area as Needed  8.	Keep Dressing Clean and dry. Principal Discharge DX:	Knee osteoarthritis  Goal:	return to baseline ADLs  Instructions for follow-up, activity and diet:	1.	Pain Control  2.	Walking with full weight bearing as tolerated, with assistive devices (walker/Cane as Needed)  3.	DVT Prophylaxis, Lovenox 40mg subcutaneous daily for 14 days. Day 15 start Aspirin 325 mg twice a day for 30 days. do not skip doses.  4.	PT as needed  5.	Follow up with Dr. Basurto as Outpatient in 10-14 Days after Discharge from the Hospital or Rehab. Call Office For Appointment. [phone]  6.	Remove Staples Post-Op Day 21, and Remove Dressing Post-Op Day 10, with Daily Dressing Changes as Need.  7.	Ice/Elevate affected area as Needed  8.	Keep Dressing Clean and dry. Principal Discharge DX:	Knee osteoarthritis  Goal:	return to baseline ADLs  Instructions for follow-up, activity and diet:	1.	Pain Control  2.	Walking with full weight bearing as tolerated, with assistive devices (walker/Cane as Needed)  3.	DVT Prophylaxis, Lovenox 40mg subcutaneous daily for 14 days. Day 15 start Aspirin 325 mg twice a day for 30 days. do not skip doses.  4.	PT as needed  5.	Follow up with Dr. Basurto as Outpatient in 10-14 Days after Discharge from the Hospital or Rehab. Call Office For Appointment. [phone]  6.	Remove Staples Post-Op Day 21, and Remove Dressing Post-Op Day 10, with Daily Dressing Changes as Need.  7.	Ice/Elevate affected area as Needed  8.	Keep Dressing Clean and dry. Principal Discharge DX:	Knee osteoarthritis  Goal:	return to baseline ADLs  Instructions for follow-up, activity and diet:	1.	Pain Control  2.	Walking with full weight bearing as tolerated, with assistive devices (walker/Cane as Needed)  3.	DVT Prophylaxis, Lovenox 40mg subcutaneous daily for 14 days. Day 15 start Aspirin 325 mg twice a day for 30 days. do not skip doses.  4.	PT as needed  5.	Follow up with Dr. Basurto as Outpatient in 10-14 Days after Discharge from the Hospital or Rehab. Call Office For Appointment. [phone]  6.	Remove Staples Post-Op Day 21, and Remove Dressing Post-Op Day 10, with Daily Dressing Changes as Need.  7.	Ice/Elevate affected area as Needed  8.	Keep Dressing Clean and dry. Principal Discharge DX:	Knee osteoarthritis  Goal:	return to baseline ADLs  Instructions for follow-up, activity and diet:	1.	Pain Control  2.	Walking with full weight bearing as tolerated, with assistive devices (walker/Cane as Needed)  3.	DVT Prophylaxis, Lovenox 40mg subcutaneous daily for 14 days. Day 15 start Aspirin 325 mg twice a day for 30 days. do not skip doses.  4.	PT as needed  5.	Follow up with Dr. Basurto as Outpatient in 10-14 Days after Discharge from the Hospital or Rehab. Call Office For Appointment. [phone]  6.	Remove Staples Post-Op Day 21, and Remove Dressing Post-Op Day 10, with Daily Dressing Changes as Need.  7.	Ice/Elevate affected area as Needed  8.	Keep Dressing Clean and dry.

## 2017-09-01 NOTE — DISCHARGE NOTE ADULT - PLAN OF CARE
return to baseline ADLs 1.	Pain Control  2.	Walking with full weight bearing as tolerated, with assistive devices (walker/Cane as Needed)  3.	DVT Prophylaxis, Lovenox 40mg subcutaneous daily for 14 days. Day 15 start Aspirin 325 mg twice a day for 30 days. do not skip doses.  4.	PT as needed  5.	Follow up with Dr. Basurto as Outpatient in 10-14 Days after Discharge from the Hospital or Rehab. Call Office For Appointment. [phone]  6.	Remove Staples Post-Op Day 21, and Remove Dressing Post-Op Day 10, with Daily Dressing Changes as Need.  7.	Ice/Elevate affected area as Needed  8.	Keep Dressing Clean and dry.

## 2017-09-01 NOTE — OCCUPATIONAL THERAPY INITIAL EVALUATION ADULT - LIVES WITH, PROFILE
on the second floor in a 2 family  home ; pt  has 4 steps to enter and 15 steps  to  get to  her treatment; pt's bathroom  has a tub/shower combination   and is not equipped with   grab bars, shower chair  or raised toilet seat; pt  has already  received a 3:1 commode and rolling walker prior to  her surgery./alone alone/on the second floor in a 2 family home ; pt  has 4 steps to enter and 15 steps to  get to her treatment; pt's bathroom has a tub/shower combination and is not equipped with grab bars, shower chair  or raised toilet seat; pt  has already  received a 3:1 commode and rolling walker prior to  her surgery.

## 2017-09-01 NOTE — DISCHARGE NOTE ADULT - MEDICATION SUMMARY - MEDICATIONS TO TAKE
I will START or STAY ON the medications listed below when I get home from the hospital:    Ecotrin 325 mg oral delayed release tablet  -- 1 tab(s) by mouth 2 times a day for 30 days for dvt ppx. do not skip doses. start day 15.  -- Swallow whole.  Do not crush.  Take with food or milk.    -- Indication: For for dvt ppx    oxyCODONE-acetaminophen 5 mg-325 mg oral tablet  -- 1 tab(s) by mouth every 4 hours, As Needed for pain MDD:6 pills  -- Caution federal law prohibits the transfer of this drug to any person other  than the person for whom it was prescribed.  May cause drowsiness.  Alcohol may intensify this effect.  Use care when operating dangerous machinery.  This prescription cannot be refilled.  This product contains acetaminophen.  Do not use  with any other product containing acetaminophen to prevent possible liver damage.  Using more of this medication than prescribed may cause serious breathing problems.    -- Indication: For for pain    Lovenox 40 mg/0.4 mL injectable solution  -- 40 milligram(s) injectable once a day for14 days for dvt ppx. do not skip doses.  -- It is very important that you take or use this exactly as directed.  Do not skip doses or discontinue unless directed by your doctor.    -- Indication: For for dvt ppx    Allegra  -- 1 tab(s) by mouth once a day, As Needed  -- Indication: For per pmd    losartan-hydrochlorothiazide 100mg-12.5mg oral tablet  -- 1 tab(s) by mouth once a day  -- Indication: For per pmd    Vitamin B-12 1000 mcg oral tablet  -- 1 tab(s) by mouth once a day  -- Indication: For per pmd    Vitamin D3 1000 intl units oral capsule  -- 1 cap(s) by mouth once a day  -- Indication: For per pmd I will START or STAY ON the medications listed below when I get home from the hospital:    Ecotrin 325 mg oral delayed release tablet  -- 1 tab(s) by mouth 2 times a day for 30 days for dvt ppx. do not skip doses. start day 15.  -- Swallow whole.  Do not crush.  Take with food or milk.    -- Indication: For for dvt ppx    oxyCODONE-acetaminophen 5 mg-325 mg oral tablet  -- 1 tab(s) by mouth every 4 hours, As Needed for pain MDD:6 pills  -- Caution federal law prohibits the transfer of this drug to any person other  than the person for whom it was prescribed.  May cause drowsiness.  Alcohol may intensify this effect.  Use care when operating dangerous machinery.  This prescription cannot be refilled.  This product contains acetaminophen.  Do not use  with any other product containing acetaminophen to prevent possible liver damage.  Using more of this medication than prescribed may cause serious breathing problems.    -- Indication: For for pain    Lovenox 40 mg/0.4 mL injectable solution  -- 40 milligram(s) injectable once a day for14 days for dvt ppx. do not skip doses.  -- It is very important that you take or use this exactly as directed.  Do not skip doses or discontinue unless directed by your doctor.    -- Indication: For dvt ppx    Lovenox 40 mg/0.4 mL injectable solution  -- 40 milligram(s) injectable once a day for14 days for dvt ppx. do not skip doses.  -- It is very important that you take or use this exactly as directed.  Do not skip doses or discontinue unless directed by your doctor.    -- Indication: For for dvt ppx    Allegra  -- 1 tab(s) by mouth once a day, As Needed  -- Indication: For per pmd    losartan-hydrochlorothiazide 100mg-12.5mg oral tablet  -- 1 tab(s) by mouth once a day  -- Indication: For per pmd    Vitamin B-12 1000 mcg oral tablet  -- 1 tab(s) by mouth once a day  -- Indication: For per pmd    Vitamin D3 1000 intl units oral capsule  -- 1 cap(s) by mouth once a day  -- Indication: For per pmd

## 2017-09-01 NOTE — PROGRESS NOTE ADULT - SUBJECTIVE AND OBJECTIVE BOX
Patient seen and examined. Pain controlled. No acute events overnight    Vital Signs Last 24 Hrs  T(C): 36.6 (09-01-17 @ 04:22), Max: 36.7 (08-31-17 @ 08:04)  T(F): 97.9 (09-01-17 @ 04:22), Max: 98 (08-31-17 @ 08:04)  HR: 79 (09-01-17 @ 04:22) (71 - 96)  BP: 135/80 (09-01-17 @ 04:22) (113/71 - 140/87)  BP(mean): --  RR: 18 (09-01-17 @ 04:22) (13 - 20)  SpO2: 99% (09-01-17 @ 04:22) (92% - 100%)    Physical Exam  Gen: NAD  LLE:   Dressing c/d/i  Wound vac in place  +EHL/FHL/Gsc/TA  SILT L3-S1  DP +  Compartments soft  No calf ttp    A/P: 82y Female sp L TKA POD 1  Pain control  DVT ppx  PT/OT, WBAT  FU labs  Dispo planning  D/w attending

## 2017-09-01 NOTE — OCCUPATIONAL THERAPY INITIAL EVALUATION ADULT - SOCIAL CONCERNS
Pt voiced concerns  about the  pain in her left  knee and  her recovery at home./Complex psychosocial needs/coping issues

## 2017-09-02 LAB
ALBUMIN SERPL ELPH-MCNC: 3.1 G/DL — LOW (ref 3.3–5)
ALP SERPL-CCNC: 85 U/L — SIGNIFICANT CHANGE UP (ref 40–120)
ALT FLD-CCNC: 14 U/L — SIGNIFICANT CHANGE UP (ref 12–78)
ANION GAP SERPL CALC-SCNC: 12 MMOL/L — SIGNIFICANT CHANGE UP (ref 5–17)
AST SERPL-CCNC: 35 U/L — SIGNIFICANT CHANGE UP (ref 15–37)
BILIRUB SERPL-MCNC: 0.6 MG/DL — SIGNIFICANT CHANGE UP (ref 0.2–1.2)
BUN SERPL-MCNC: 17 MG/DL — SIGNIFICANT CHANGE UP (ref 7–23)
CALCIUM SERPL-MCNC: 8.8 MG/DL — SIGNIFICANT CHANGE UP (ref 8.5–10.1)
CHLORIDE SERPL-SCNC: 100 MMOL/L — SIGNIFICANT CHANGE UP (ref 96–108)
CO2 SERPL-SCNC: 24 MMOL/L — SIGNIFICANT CHANGE UP (ref 22–31)
CREAT SERPL-MCNC: 1.08 MG/DL — SIGNIFICANT CHANGE UP (ref 0.5–1.3)
GLUCOSE SERPL-MCNC: 147 MG/DL — HIGH (ref 70–99)
HCT VFR BLD CALC: 34 % — LOW (ref 34.5–45)
HGB BLD-MCNC: 11.4 G/DL — LOW (ref 11.5–15.5)
MCHC RBC-ENTMCNC: 28.7 PG — SIGNIFICANT CHANGE UP (ref 27–34)
MCHC RBC-ENTMCNC: 33.5 GM/DL — SIGNIFICANT CHANGE UP (ref 32–36)
MCV RBC AUTO: 85.7 FL — SIGNIFICANT CHANGE UP (ref 80–100)
PLATELET # BLD AUTO: 276 K/UL — SIGNIFICANT CHANGE UP (ref 150–400)
POTASSIUM SERPL-MCNC: 3.7 MMOL/L — SIGNIFICANT CHANGE UP (ref 3.5–5.3)
POTASSIUM SERPL-SCNC: 3.7 MMOL/L — SIGNIFICANT CHANGE UP (ref 3.5–5.3)
PROT SERPL-MCNC: 7.2 GM/DL — SIGNIFICANT CHANGE UP (ref 6–8.3)
RBC # BLD: 3.97 M/UL — SIGNIFICANT CHANGE UP (ref 3.8–5.2)
RBC # FLD: 13 % — SIGNIFICANT CHANGE UP (ref 11–15)
SODIUM SERPL-SCNC: 136 MMOL/L — SIGNIFICANT CHANGE UP (ref 135–145)
WBC # BLD: 17.2 K/UL — HIGH (ref 3.8–10.5)
WBC # FLD AUTO: 17.2 K/UL — HIGH (ref 3.8–10.5)

## 2017-09-02 RX ORDER — ENOXAPARIN SODIUM 100 MG/ML
40 INJECTION SUBCUTANEOUS
Qty: 30 | Refills: 0 | OUTPATIENT
Start: 2017-09-02 | End: 2017-10-02

## 2017-09-02 RX ORDER — ENOXAPARIN SODIUM 100 MG/ML
40 INJECTION SUBCUTANEOUS
Qty: 14 | Refills: 0 | OUTPATIENT
Start: 2017-09-02 | End: 2017-09-16

## 2017-09-02 RX ADMIN — LOSARTAN POTASSIUM 100 MILLIGRAM(S): 100 TABLET, FILM COATED ORAL at 06:04

## 2017-09-02 RX ADMIN — Medication 25 MILLIGRAM(S): at 06:04

## 2017-09-02 RX ADMIN — Medication 1 TABLET(S): at 12:29

## 2017-09-02 RX ADMIN — OXYCODONE HYDROCHLORIDE 10 MILLIGRAM(S): 5 TABLET ORAL at 21:01

## 2017-09-02 RX ADMIN — SODIUM CHLORIDE 3 MILLILITER(S): 9 INJECTION INTRAMUSCULAR; INTRAVENOUS; SUBCUTANEOUS at 14:45

## 2017-09-02 RX ADMIN — SODIUM CHLORIDE 3 MILLILITER(S): 9 INJECTION INTRAMUSCULAR; INTRAVENOUS; SUBCUTANEOUS at 23:25

## 2017-09-02 RX ADMIN — SODIUM CHLORIDE 3 MILLILITER(S): 9 INJECTION INTRAMUSCULAR; INTRAVENOUS; SUBCUTANEOUS at 06:08

## 2017-09-02 RX ADMIN — Medication 325 MILLIGRAM(S): at 09:21

## 2017-09-02 RX ADMIN — OXYCODONE HYDROCHLORIDE 10 MILLIGRAM(S): 5 TABLET ORAL at 22:00

## 2017-09-02 RX ADMIN — ENOXAPARIN SODIUM 40 MILLIGRAM(S): 100 INJECTION SUBCUTANEOUS at 09:20

## 2017-09-02 RX ADMIN — OXYCODONE HYDROCHLORIDE 10 MILLIGRAM(S): 5 TABLET ORAL at 13:51

## 2017-09-02 RX ADMIN — OXYCODONE HYDROCHLORIDE 10 MILLIGRAM(S): 5 TABLET ORAL at 14:30

## 2017-09-02 NOTE — PROGRESS NOTE ADULT - SUBJECTIVE AND OBJECTIVE BOX
Patient seen and examined. Pain controlled. No acute events overnight. Pt states would like to go home    Vital Signs Last 24 Hrs  T(C): 37 (09-02-17 @ 04:55), Max: 37.7 (09-02-17 @ 00:30)  T(F): 98.6 (09-02-17 @ 04:55), Max: 99.8 (09-02-17 @ 00:30)  HR: 94 (09-02-17 @ 04:55) (89 - 107)  BP: 147/78 (09-02-17 @ 04:55) (125/73 - 153/86)  BP(mean): --  RR: 16 (09-02-17 @ 04:55) (16 - 17)  SpO2: 95% (09-02-17 @ 04:55) (94% - 99%)                          11.4   17.2  )-----------( 276      ( 02 Sep 2017 05:47 )             34.0       Physical Exam  Gen: NAD  ___ LE:   Dressing c/d/i  +EHL/FHL/Gsc/TA  SILT L3-S1  DP +  Compartments soft  No calf ttp    A/P: 82y Female sp L TKA POD 2  Pain control  DVT ppx  PT/OT, WBAT  FU labs  DC planning   D/w attending

## 2017-09-03 VITALS
OXYGEN SATURATION: 94 % | TEMPERATURE: 97 F | HEART RATE: 86 BPM | RESPIRATION RATE: 16 BRPM | DIASTOLIC BLOOD PRESSURE: 54 MMHG | SYSTOLIC BLOOD PRESSURE: 100 MMHG

## 2017-09-03 LAB
ALBUMIN SERPL ELPH-MCNC: 2.8 G/DL — LOW (ref 3.3–5)
ALP SERPL-CCNC: 71 U/L — SIGNIFICANT CHANGE UP (ref 40–120)
ALT FLD-CCNC: 15 U/L — SIGNIFICANT CHANGE UP (ref 12–78)
ANION GAP SERPL CALC-SCNC: 9 MMOL/L — SIGNIFICANT CHANGE UP (ref 5–17)
AST SERPL-CCNC: 31 U/L — SIGNIFICANT CHANGE UP (ref 15–37)
BILIRUB SERPL-MCNC: 0.5 MG/DL — SIGNIFICANT CHANGE UP (ref 0.2–1.2)
BUN SERPL-MCNC: 20 MG/DL — SIGNIFICANT CHANGE UP (ref 7–23)
CALCIUM SERPL-MCNC: 8.5 MG/DL — SIGNIFICANT CHANGE UP (ref 8.5–10.1)
CHLORIDE SERPL-SCNC: 98 MMOL/L — SIGNIFICANT CHANGE UP (ref 96–108)
CO2 SERPL-SCNC: 26 MMOL/L — SIGNIFICANT CHANGE UP (ref 22–31)
CREAT SERPL-MCNC: 1.2 MG/DL — SIGNIFICANT CHANGE UP (ref 0.5–1.3)
GLUCOSE SERPL-MCNC: 115 MG/DL — HIGH (ref 70–99)
HCT VFR BLD CALC: 29.5 % — LOW (ref 34.5–45)
HGB BLD-MCNC: 10 G/DL — LOW (ref 11.5–15.5)
MCHC RBC-ENTMCNC: 28.7 PG — SIGNIFICANT CHANGE UP (ref 27–34)
MCHC RBC-ENTMCNC: 33.8 GM/DL — SIGNIFICANT CHANGE UP (ref 32–36)
MCV RBC AUTO: 84.8 FL — SIGNIFICANT CHANGE UP (ref 80–100)
PLATELET # BLD AUTO: 283 K/UL — SIGNIFICANT CHANGE UP (ref 150–400)
POTASSIUM SERPL-MCNC: 3.5 MMOL/L — SIGNIFICANT CHANGE UP (ref 3.5–5.3)
POTASSIUM SERPL-SCNC: 3.5 MMOL/L — SIGNIFICANT CHANGE UP (ref 3.5–5.3)
PROT SERPL-MCNC: 6.7 GM/DL — SIGNIFICANT CHANGE UP (ref 6–8.3)
RBC # BLD: 3.48 M/UL — LOW (ref 3.8–5.2)
RBC # FLD: 13.1 % — SIGNIFICANT CHANGE UP (ref 11–15)
SODIUM SERPL-SCNC: 133 MMOL/L — LOW (ref 135–145)
WBC # BLD: 15.4 K/UL — HIGH (ref 3.8–10.5)
WBC # FLD AUTO: 15.4 K/UL — HIGH (ref 3.8–10.5)

## 2017-09-03 RX ADMIN — OXYCODONE HYDROCHLORIDE 10 MILLIGRAM(S): 5 TABLET ORAL at 09:35

## 2017-09-03 RX ADMIN — OXYCODONE HYDROCHLORIDE 10 MILLIGRAM(S): 5 TABLET ORAL at 10:30

## 2017-09-03 RX ADMIN — SODIUM CHLORIDE 3 MILLILITER(S): 9 INJECTION INTRAMUSCULAR; INTRAVENOUS; SUBCUTANEOUS at 06:41

## 2017-09-03 RX ADMIN — ENOXAPARIN SODIUM 40 MILLIGRAM(S): 100 INJECTION SUBCUTANEOUS at 08:45

## 2017-09-03 RX ADMIN — OXYCODONE HYDROCHLORIDE 10 MILLIGRAM(S): 5 TABLET ORAL at 13:30

## 2017-09-03 RX ADMIN — OXYCODONE HYDROCHLORIDE 10 MILLIGRAM(S): 5 TABLET ORAL at 14:30

## 2017-09-03 RX ADMIN — OXYCODONE HYDROCHLORIDE 10 MILLIGRAM(S): 5 TABLET ORAL at 04:50

## 2017-09-03 RX ADMIN — OXYCODONE HYDROCHLORIDE 10 MILLIGRAM(S): 5 TABLET ORAL at 03:51

## 2017-09-03 NOTE — PROGRESS NOTE ADULT - SUBJECTIVE AND OBJECTIVE BOX
Patient seen and examined. Pain controlled. No acute events overnight. Pt states would like to go home    Vital Signs Last 24 Hrs  T(C): 37.6 (03 Sep 2017 05:57), Max: 37.8 (03 Sep 2017 00:11)  T(F): 99.7 (03 Sep 2017 05:57), Max: 100 (03 Sep 2017 00:11)  HR: 95 (03 Sep 2017 05:57) (95 - 107)  BP: 109/60 (03 Sep 2017 05:57) (109/60 - 160/89)  BP(mean): --  RR: 16 (03 Sep 2017 05:57) (16 - 17)  SpO2: 92% (03 Sep 2017 05:57) (92% - 95%)                            11.4   17.2  )-----------( 276      ( 02 Sep 2017 05:47 )             34.0     02 Sep 2017 05:47    136    |  100    |  17     ----------------------------<  147    3.7     |  24     |  1.08     Ca    8.8        02 Sep 2017 05:47    TPro  7.2    /  Alb  3.1    /  TBili  0.6    /  DBili  x      /  AST  35     /  ALT  14     /  AlkPhos  85     02 Sep 2017 05:47        Physical Exam  Gen: NAD  LLE:   Dressing c/d/i  +EHL/FHL/Gsc/TA  SILT L3-S1  DP +  Compartments soft  No calf ttp    A/P: 82y Female sp L TKA POD 2  Pain control  DVT ppx  PT/OT, WBAT  FU labs  DC planning today  D/w attending

## 2017-09-03 NOTE — PROGRESS NOTE ADULT - SUBJECTIVE AND OBJECTIVE BOX
Chief Complaint:  Patient is a 82y old  Female who presents with a chief complaint of left TKA (01 Sep 2017 20:51)      HPI:  Came in for follow up for elevated WBC count . Afebrile . Non toxic . Trending down . No other associated somatic symptoms .       Review of Systems:    General:  No wt loss, fevers, chills, night sweats  Eyes:  Good vision, no reported pain  ENT:  No sore throat, pain, runny nose, dysphagia  CV:  No pain, palpitations , hypo/hypertension  Resp:  No dyspnea, cough, tachypnea, wheezing  GI:  No pain, nausea, vomiting, diarrhea .  :  No pain, bleeding, incontinence, nocturia  Muscle:  No pain, weakness  Breast:  No pain, abscess, mass, discharge  Neuro:  No weakness, tingling, memory problems  Psych:  No fatigue, insomnia, mood problems, depression  Endocrine:  No polyuria, polydypsia, cold/heat intolerance  Heme:  No petechiae, ecchymosis, easy bruisability  Skin:  No rash, tattoos, scars, edema      Physical Exam:      Vital Signs:  Vital Signs Last 24 Hrs  T(C): 37.6 (03 Sep 2017 05:57), Max: 37.8 (03 Sep 2017 00:11)  T(F): 99.7 (03 Sep 2017 05:57), Max: 100 (03 Sep 2017 00:11)  HR: 95 (03 Sep 2017 05:57) (95 - 107)  BP: 109/60 (03 Sep 2017 05:57) (109/60 - 160/89)  BP(mean): --  RR: 16 (03 Sep 2017 05:57) (16 - 17)  SpO2: 92% (03 Sep 2017 05:57) (92% - 95%)  Daily     Daily   I&O's Summary      General:  Appears stated age, well-groomed, well-nourished, no distress  HEENT:  NC/AT, patent nares w/ pink mucosa, OP clear w/o lesions, PERRL, EOMI, conjunctivae clear, no thyromegaly, nodules, adenopathy, no JVD  Chest:  Full & symmetric excursion, no increased effort, breath sounds clear  Cardiovascular:  Regular rhythm, S1, S2, no murmur/rub/S3/S4, no carotid/femoral/abdominal bruit, radial/pedal pulses 2+ .  Abdomen:  Soft, non-tender, non-distended, normoactive bowel sounds, no HSM  Extremities:  trace edema left leg . + pulse .  Skin:  No rash/erythema/ecchymoses/petechiae/wounds/abscess/warm/dry  Musculoskeletal:  Intact .  Neuro/Psych:  Alert, oriented, normal and symmetric strength in UEs, LEs .     Laboratory:                            10.0   15.4  )-----------( 283      ( 03 Sep 2017 07:48 )             29.5     09-03    133<L>  |  98  |  20  ----------------------------<  115<H>  3.5   |  26  |  1.20    Ca    8.5      03 Sep 2017 07:48    TPro  6.7  /  Alb  2.8<L>  /  TBili  0.5  /  DBili  x   /  AST  31  /  ALT  15  /  AlkPhos  71  09-03          CAPILLARY BLOOD GLUCOSE        LIVER FUNCTIONS - ( 03 Sep 2017 07:48 )  Alb: 2.8 g/dL / Pro: 6.7 gm/dL / ALK PHOS: 71 U/L / ALT: 15 U/L / AST: 31 U/L / GGT: x                 Imaging:      Assessment: Leucocytosis . S/P left knee replacement .       Plan:  Likely inflammatory response .  Trending  down . Non  toxic . CBC as outpatient . Can discharge home . DVT prophylaxsis . D/W the patient .

## 2017-09-05 LAB — SURGICAL PATHOLOGY FINAL REPORT - CH: SIGNIFICANT CHANGE UP

## 2017-09-06 DIAGNOSIS — M17.12 UNILATERAL PRIMARY OSTEOARTHRITIS, LEFT KNEE: ICD-10-CM

## 2017-09-06 DIAGNOSIS — Z87.891 PERSONAL HISTORY OF NICOTINE DEPENDENCE: ICD-10-CM

## 2017-09-06 DIAGNOSIS — I10 ESSENTIAL (PRIMARY) HYPERTENSION: ICD-10-CM

## 2017-09-06 DIAGNOSIS — D72.829 ELEVATED WHITE BLOOD CELL COUNT, UNSPECIFIED: ICD-10-CM

## 2017-09-08 ENCOUNTER — RX RENEWAL (OUTPATIENT)
Age: 82
End: 2017-09-08

## 2017-09-22 ENCOUNTER — APPOINTMENT (OUTPATIENT)
Dept: ORTHOPEDIC SURGERY | Facility: CLINIC | Age: 82
End: 2017-09-22
Payer: MEDICARE

## 2017-09-22 PROCEDURE — 99024 POSTOP FOLLOW-UP VISIT: CPT

## 2017-10-20 ENCOUNTER — APPOINTMENT (OUTPATIENT)
Dept: ORTHOPEDIC SURGERY | Facility: CLINIC | Age: 82
End: 2017-10-20
Payer: MEDICARE

## 2017-10-20 DIAGNOSIS — Z47.1 AFTERCARE FOLLOWING JOINT REPLACEMENT SURGERY: ICD-10-CM

## 2017-10-20 DIAGNOSIS — Z96.652 AFTERCARE FOLLOWING JOINT REPLACEMENT SURGERY: ICD-10-CM

## 2017-10-20 PROCEDURE — 99024 POSTOP FOLLOW-UP VISIT: CPT

## 2017-10-20 PROCEDURE — 73560 X-RAY EXAM OF KNEE 1 OR 2: CPT | Mod: RT

## 2017-10-20 PROCEDURE — 73562 X-RAY EXAM OF KNEE 3: CPT | Mod: LT

## 2017-11-16 ENCOUNTER — MEDICATION RENEWAL (OUTPATIENT)
Age: 82
End: 2017-11-16

## 2017-12-15 ENCOUNTER — APPOINTMENT (OUTPATIENT)
Dept: ORTHOPEDIC SURGERY | Facility: CLINIC | Age: 82
End: 2017-12-15
Payer: MEDICARE

## 2017-12-15 PROCEDURE — 73562 X-RAY EXAM OF KNEE 3: CPT | Mod: 50

## 2017-12-15 PROCEDURE — 20610 DRAIN/INJ JOINT/BURSA W/O US: CPT | Mod: RT

## 2017-12-15 PROCEDURE — 99214 OFFICE O/P EST MOD 30 MIN: CPT | Mod: 25

## 2018-02-12 ENCOUNTER — APPOINTMENT (OUTPATIENT)
Dept: FAMILY MEDICINE | Facility: CLINIC | Age: 83
End: 2018-02-12
Payer: MEDICARE

## 2018-02-12 ENCOUNTER — LABORATORY RESULT (OUTPATIENT)
Age: 83
End: 2018-02-12

## 2018-02-12 VITALS
DIASTOLIC BLOOD PRESSURE: 80 MMHG | TEMPERATURE: 97.8 F | WEIGHT: 143 LBS | BODY MASS INDEX: 27 KG/M2 | HEIGHT: 61 IN | SYSTOLIC BLOOD PRESSURE: 130 MMHG

## 2018-02-12 PROCEDURE — 36415 COLL VENOUS BLD VENIPUNCTURE: CPT

## 2018-02-12 PROCEDURE — 96372 THER/PROPH/DIAG INJ SC/IM: CPT

## 2018-02-12 PROCEDURE — 99214 OFFICE O/P EST MOD 30 MIN: CPT | Mod: 25

## 2018-02-12 RX ORDER — MELOXICAM 7.5 MG/1
7.5 TABLET ORAL
Qty: 30 | Refills: 0 | Status: DISCONTINUED | COMMUNITY
Start: 2017-03-02 | End: 2018-02-12

## 2018-02-12 RX ORDER — TRAMADOL HYDROCHLORIDE 50 MG/1
50 TABLET, COATED ORAL
Qty: 40 | Refills: 0 | Status: DISCONTINUED | COMMUNITY
Start: 2017-12-15 | End: 2018-02-12

## 2018-02-12 RX ORDER — CELECOXIB 200 MG/1
200 CAPSULE ORAL
Qty: 2 | Refills: 0 | Status: DISCONTINUED | COMMUNITY
Start: 2017-08-08 | End: 2018-02-12

## 2018-02-12 RX ORDER — CYANOCOBALAMIN 1000 UG/ML
1000 INJECTION INTRAMUSCULAR; SUBCUTANEOUS
Qty: 0 | Refills: 0 | Status: COMPLETED | OUTPATIENT
Start: 2018-02-12

## 2018-02-12 RX ORDER — OXYCODONE AND ACETAMINOPHEN 5; 325 MG/1; MG/1
5-325 TABLET ORAL
Qty: 60 | Refills: 0 | Status: DISCONTINUED | COMMUNITY
Start: 2017-09-08 | End: 2018-02-12

## 2018-02-12 RX ORDER — OXYCODONE AND ACETAMINOPHEN 5; 325 MG/1; MG/1
5-325 TABLET ORAL
Qty: 60 | Refills: 0 | Status: DISCONTINUED | COMMUNITY
Start: 2017-10-20 | End: 2018-02-12

## 2018-02-12 RX ORDER — LOSARTAN POTASSIUM AND HYDROCHLOROTHIAZIDE 12.5; 1 MG/1; MG/1
100-12.5 TABLET ORAL DAILY
Qty: 90 | Refills: 0 | Status: DISCONTINUED | COMMUNITY
Start: 2017-02-23 | End: 2018-02-12

## 2018-02-12 RX ORDER — ENOXAPARIN SODIUM 100 MG/ML
40 INJECTION SUBCUTANEOUS
Qty: 5 | Refills: 0 | Status: DISCONTINUED | COMMUNITY
Start: 2017-09-02 | End: 2018-02-12

## 2018-02-12 RX ADMIN — CYANOCOBALAMIN 0 MCG/ML: 1000 INJECTION, SOLUTION INTRAMUSCULAR at 00:00

## 2018-02-13 LAB
25(OH)D3 SERPL-MCNC: 27 NG/ML
ALBUMIN SERPL ELPH-MCNC: 4.7 G/DL
ALP BLD-CCNC: 102 U/L
ALT SERPL-CCNC: 13 U/L
ANION GAP SERPL CALC-SCNC: 16 MMOL/L
AST SERPL-CCNC: 15 U/L
BASOPHILS # BLD AUTO: 0.05 K/UL
BASOPHILS NFR BLD AUTO: 0.6 %
BILIRUB SERPL-MCNC: 0.4 MG/DL
BUN SERPL-MCNC: 26 MG/DL
CALCIUM SERPL-MCNC: 9.8 MG/DL
CHLORIDE SERPL-SCNC: 96 MMOL/L
CO2 SERPL-SCNC: 25 MMOL/L
CREAT SERPL-MCNC: 1.42 MG/DL
EOSINOPHIL # BLD AUTO: 0.37 K/UL
EOSINOPHIL NFR BLD AUTO: 4.1 %
GLUCOSE SERPL-MCNC: 120 MG/DL
HCT VFR BLD CALC: 41.6 %
HGB BLD-MCNC: 13.9 G/DL
IMM GRANULOCYTES NFR BLD AUTO: 0.3 %
LYMPHOCYTES # BLD AUTO: 2.04 K/UL
LYMPHOCYTES NFR BLD AUTO: 22.7 %
MAN DIFF?: NORMAL
MCHC RBC-ENTMCNC: 30.6 PG
MCHC RBC-ENTMCNC: 33.4 GM/DL
MCV RBC AUTO: 91.6 FL
MONOCYTES # BLD AUTO: 0.5 K/UL
MONOCYTES NFR BLD AUTO: 5.6 %
NEUTROPHILS # BLD AUTO: 6 K/UL
NEUTROPHILS NFR BLD AUTO: 66.7 %
PLATELET # BLD AUTO: 363 K/UL
POTASSIUM SERPL-SCNC: 4.3 MMOL/L
PROT SERPL-MCNC: 7.6 G/DL
RBC # BLD: 4.54 M/UL
RBC # FLD: 14.7 %
SODIUM SERPL-SCNC: 137 MMOL/L
TSH SERPL-ACNC: 1.88 UIU/ML
WBC # FLD AUTO: 8.99 K/UL

## 2018-03-16 ENCOUNTER — APPOINTMENT (OUTPATIENT)
Dept: ORTHOPEDIC SURGERY | Facility: CLINIC | Age: 83
End: 2018-03-16
Payer: MEDICARE

## 2018-03-16 PROCEDURE — 99214 OFFICE O/P EST MOD 30 MIN: CPT | Mod: 25

## 2018-03-16 PROCEDURE — 20610 DRAIN/INJ JOINT/BURSA W/O US: CPT | Mod: RT

## 2018-03-16 PROCEDURE — 73562 X-RAY EXAM OF KNEE 3: CPT | Mod: 50

## 2018-03-20 ENCOUNTER — APPOINTMENT (OUTPATIENT)
Dept: FAMILY MEDICINE | Facility: CLINIC | Age: 83
End: 2018-03-20
Payer: MEDICARE

## 2018-03-20 VITALS
DIASTOLIC BLOOD PRESSURE: 70 MMHG | OXYGEN SATURATION: 86 % | WEIGHT: 143 LBS | HEART RATE: 103 BPM | RESPIRATION RATE: 14 BRPM | TEMPERATURE: 97.1 F | BODY MASS INDEX: 27 KG/M2 | HEIGHT: 61 IN | SYSTOLIC BLOOD PRESSURE: 130 MMHG

## 2018-03-20 PROCEDURE — 99213 OFFICE O/P EST LOW 20 MIN: CPT

## 2018-05-29 ENCOUNTER — APPOINTMENT (OUTPATIENT)
Dept: FAMILY MEDICINE | Facility: CLINIC | Age: 83
End: 2018-05-29
Payer: MEDICARE

## 2018-05-29 VITALS
DIASTOLIC BLOOD PRESSURE: 80 MMHG | HEIGHT: 61 IN | WEIGHT: 142 LBS | SYSTOLIC BLOOD PRESSURE: 132 MMHG | BODY MASS INDEX: 26.81 KG/M2

## 2018-05-29 DIAGNOSIS — F80.89 OTHER DEVELOPMENTAL DISORDERS OF SPEECH AND LANGUAGE: ICD-10-CM

## 2018-05-29 DIAGNOSIS — R09.81 NASAL CONGESTION: ICD-10-CM

## 2018-05-29 PROCEDURE — 99214 OFFICE O/P EST MOD 30 MIN: CPT

## 2018-05-29 RX ORDER — AZITHROMYCIN 250 MG/1
250 TABLET, FILM COATED ORAL
Qty: 1 | Refills: 0 | Status: DISCONTINUED | COMMUNITY
Start: 2018-03-22 | End: 2018-05-29

## 2018-05-29 RX ORDER — MONTELUKAST 10 MG/1
10 TABLET, FILM COATED ORAL DAILY
Qty: 30 | Refills: 0 | Status: ACTIVE | COMMUNITY
Start: 2018-05-29 | End: 1900-01-01

## 2018-06-15 ENCOUNTER — APPOINTMENT (OUTPATIENT)
Dept: ORTHOPEDIC SURGERY | Facility: CLINIC | Age: 83
End: 2018-06-15
Payer: MEDICARE

## 2018-06-15 PROCEDURE — 73564 X-RAY EXAM KNEE 4 OR MORE: CPT | Mod: RT

## 2018-06-15 PROCEDURE — 20610 DRAIN/INJ JOINT/BURSA W/O US: CPT | Mod: RT

## 2018-06-15 PROCEDURE — 99214 OFFICE O/P EST MOD 30 MIN: CPT | Mod: 25

## 2018-06-15 PROCEDURE — 73562 X-RAY EXAM OF KNEE 3: CPT | Mod: LT

## 2018-08-16 ENCOUNTER — MEDICATION RENEWAL (OUTPATIENT)
Age: 83
End: 2018-08-16

## 2018-08-27 ENCOUNTER — APPOINTMENT (OUTPATIENT)
Dept: FAMILY MEDICINE | Facility: CLINIC | Age: 83
End: 2018-08-27
Payer: MEDICARE

## 2018-08-27 VITALS
DIASTOLIC BLOOD PRESSURE: 80 MMHG | BODY MASS INDEX: 26.81 KG/M2 | SYSTOLIC BLOOD PRESSURE: 122 MMHG | HEIGHT: 61 IN | WEIGHT: 142 LBS

## 2018-08-27 PROBLEM — J30.2 OTHER SEASONAL ALLERGIC RHINITIS: Chronic | Status: ACTIVE | Noted: 2017-08-16

## 2018-08-27 PROBLEM — I10 ESSENTIAL (PRIMARY) HYPERTENSION: Chronic | Status: ACTIVE | Noted: 2017-08-16

## 2018-08-27 PROCEDURE — 99213 OFFICE O/P EST LOW 20 MIN: CPT

## 2018-08-27 RX ORDER — PREDNISONE 10 MG/1
10 TABLET ORAL
Qty: 12 | Refills: 0 | Status: DISCONTINUED | COMMUNITY
Start: 2018-05-29 | End: 2018-08-27

## 2018-08-27 RX ORDER — FLUTICASONE PROPIONATE 50 UG/1
50 SPRAY, METERED NASAL DAILY
Qty: 1 | Refills: 2 | Status: ACTIVE | COMMUNITY
Start: 2018-08-27 | End: 1900-01-01

## 2018-08-27 RX ORDER — MONTELUKAST 10 MG/1
10 TABLET, FILM COATED ORAL DAILY
Qty: 14 | Refills: 0 | Status: DISCONTINUED | COMMUNITY
Start: 2018-03-20 | End: 2018-08-27

## 2018-08-28 LAB
ALBUMIN SERPL ELPH-MCNC: 4.3 G/DL
ALP BLD-CCNC: 163 U/L
ALT SERPL-CCNC: 10 U/L
ANION GAP SERPL CALC-SCNC: 14 MMOL/L
AST SERPL-CCNC: 30 U/L
BASOPHILS # BLD AUTO: 0.07 K/UL
BASOPHILS NFR BLD AUTO: 0.6 %
BILIRUB SERPL-MCNC: 0.4 MG/DL
BUN SERPL-MCNC: 20 MG/DL
CALCIUM SERPL-MCNC: 9.4 MG/DL
CHLORIDE SERPL-SCNC: 89 MMOL/L
CHOLEST SERPL-MCNC: 186 MG/DL
CHOLEST/HDLC SERPL: 3.7 RATIO
CO2 SERPL-SCNC: 25 MMOL/L
CREAT SERPL-MCNC: 1.33 MG/DL
EOSINOPHIL # BLD AUTO: 0.83 K/UL
EOSINOPHIL NFR BLD AUTO: 7.3 %
GLUCOSE SERPL-MCNC: 109 MG/DL
HCT VFR BLD CALC: 38.4 %
HDLC SERPL-MCNC: 50 MG/DL
HGB BLD-MCNC: 12.8 G/DL
IMM GRANULOCYTES NFR BLD AUTO: 0.4 %
LDLC SERPL CALC-MCNC: 112 MG/DL
LYMPHOCYTES # BLD AUTO: 3.03 K/UL
LYMPHOCYTES NFR BLD AUTO: 26.6 %
MAN DIFF?: NORMAL
MCHC RBC-ENTMCNC: 30 PG
MCHC RBC-ENTMCNC: 33.3 GM/DL
MCV RBC AUTO: 89.9 FL
MONOCYTES # BLD AUTO: 0.63 K/UL
MONOCYTES NFR BLD AUTO: 5.5 %
NEUTROPHILS # BLD AUTO: 6.8 K/UL
NEUTROPHILS NFR BLD AUTO: 59.6 %
PLATELET # BLD AUTO: 348 K/UL
POTASSIUM SERPL-SCNC: 4.3 MMOL/L
PROT SERPL-MCNC: 8 G/DL
RBC # BLD: 4.27 M/UL
RBC # FLD: 14 %
SODIUM SERPL-SCNC: 128 MMOL/L
TRIGL SERPL-MCNC: 121 MG/DL
TSH SERPL-ACNC: 2.84 UIU/ML
WBC # FLD AUTO: 11.4 K/UL

## 2018-09-26 ENCOUNTER — APPOINTMENT (OUTPATIENT)
Dept: ORTHOPEDIC SURGERY | Facility: CLINIC | Age: 83
End: 2018-09-26
Payer: MEDICARE

## 2018-09-26 PROCEDURE — 99213 OFFICE O/P EST LOW 20 MIN: CPT | Mod: 25

## 2018-09-26 PROCEDURE — 73562 X-RAY EXAM OF KNEE 3: CPT | Mod: LT

## 2018-09-26 PROCEDURE — 20610 DRAIN/INJ JOINT/BURSA W/O US: CPT | Mod: RT

## 2018-11-01 ENCOUNTER — APPOINTMENT (OUTPATIENT)
Dept: PULMONOLOGY | Facility: CLINIC | Age: 83
End: 2018-11-01
Payer: MEDICARE

## 2018-11-01 VITALS
BODY MASS INDEX: 28.13 KG/M2 | DIASTOLIC BLOOD PRESSURE: 70 MMHG | RESPIRATION RATE: 17 BRPM | HEIGHT: 61 IN | WEIGHT: 149 LBS | OXYGEN SATURATION: 64 % | HEART RATE: 103 BPM | SYSTOLIC BLOOD PRESSURE: 126 MMHG

## 2018-11-01 DIAGNOSIS — Z82.5 FAMILY HISTORY OF ASTHMA AND OTHER CHRONIC LOWER RESPIRATORY DISEASES: ICD-10-CM

## 2018-11-01 DIAGNOSIS — Z82.49 FAMILY HISTORY OF ISCHEMIC HEART DISEASE AND OTHER DISEASES OF THE CIRCULATORY SYSTEM: ICD-10-CM

## 2018-11-01 DIAGNOSIS — R09.82 POSTNASAL DRIP: ICD-10-CM

## 2018-11-01 DIAGNOSIS — R26.89 OTHER ABNORMALITIES OF GAIT AND MOBILITY: ICD-10-CM

## 2018-11-01 DIAGNOSIS — Z80.51 FAMILY HISTORY OF MALIGNANT NEOPLASM OF KIDNEY: ICD-10-CM

## 2018-11-01 DIAGNOSIS — Z88.9 ALLERGY STATUS TO UNSPECIFIED DRUGS, MEDICAMENTS AND BIOLOGICAL SUBSTANCES: ICD-10-CM

## 2018-11-01 DIAGNOSIS — Z86.39 PERSONAL HISTORY OF OTHER ENDOCRINE, NUTRITIONAL AND METABOLIC DISEASE: ICD-10-CM

## 2018-11-01 DIAGNOSIS — M19.90 UNSPECIFIED OSTEOARTHRITIS, UNSPECIFIED SITE: ICD-10-CM

## 2018-11-01 DIAGNOSIS — R05 COUGH: ICD-10-CM

## 2018-11-01 DIAGNOSIS — Z63.4 DISAPPEARANCE AND DEATH OF FAMILY MEMBER: ICD-10-CM

## 2018-11-01 DIAGNOSIS — J96.11 CHRONIC RESPIRATORY FAILURE WITH HYPOXIA: ICD-10-CM

## 2018-11-01 DIAGNOSIS — Z86.79 PERSONAL HISTORY OF OTHER DISEASES OF THE CIRCULATORY SYSTEM: ICD-10-CM

## 2018-11-01 PROCEDURE — 94727 GAS DIL/WSHOT DETER LNG VOL: CPT

## 2018-11-01 PROCEDURE — ZZZZZ: CPT

## 2018-11-01 PROCEDURE — 99205 OFFICE O/P NEW HI 60 MIN: CPT | Mod: 25

## 2018-11-01 PROCEDURE — 94729 DIFFUSING CAPACITY: CPT

## 2018-11-01 PROCEDURE — 94618 PULMONARY STRESS TESTING: CPT

## 2018-11-01 PROCEDURE — 71046 X-RAY EXAM CHEST 2 VIEWS: CPT

## 2018-11-01 PROCEDURE — 94060 EVALUATION OF WHEEZING: CPT | Mod: 59

## 2018-11-01 SDOH — SOCIAL STABILITY - SOCIAL INSECURITY: DISSAPEARANCE AND DEATH OF FAMILY MEMBER: Z63.4

## 2018-11-01 NOTE — ADDENDUM
[FreeTextEntry1] : All medical record entries made by jake Rivera were at Dr. Zhao Horowitz's, direction and personally dictated by me on (11/01/2018). I have reviewed the chart and agree that the record accurately reflects my personal performance of the history, physical exam, assessment and plan. I have also personally directed, reviewed, and agree with the discharge instructions.

## 2018-11-01 NOTE — ASSESSMENT
[FreeTextEntry1] : Ms. Monzon is a 83 year old male with a history of HTN, osteoarthritis, HLD, allergies, former smoker who now comes in for pulmonary pre op clearance.\par \par Her shortness of breath is multifactorial due to:\par -poor mechanics of breathing (poor balance)\par -out of shape / overweight\par -pulmonary disease\par -cardiac disease \par \par problem 1: COPD \par -due to long term smoking history \par -add Trelegy 1 puff QD \par \par -COPD is a progressive disease and although it can’t be cured , appropriate management can slow its progression, reduce frequency and severity of exacerbations, and improve symptoms and the patient quality of life. Hospitalizations are the greatest contributor to the total COPD costs and account for up to 87% of total COPD related costs. Exacerbations are the main cause of admissions and subsequently account for the 40-75% of COPD costs. Inhaled maintenance therapy reduces the incidence of exacerbations in patients with stable COPD. Incorrect inhaler use and nonadherence are major obstacles to achieving COPD treatment goals. Many COPD patients have challenges (impaired inhalation, limited dexterity, reduced cognition: that limit their ability to correctly use their COPD treatment devices resulting in reduced symptom control. Of most importance is smoking cessation and early intervention with respiratory illnesses and contemplation for pulmonary rehab to enhance quality of life.\par \par problem 2: ? Interstitial Lung Disease\par -recommended to get High resolution CAT scan of chest\par -no medications needed at this time\par -not currently interested in getting supplemental oxygen \par \par Etiology will depend on the causative agent possibilities include: idiopathic pulmonary fibrosis (UIP), NSIP (nonspecific interstitial pneumonia) , respiratory bronchiolitis in someone who is a smoker, drug induced lung disease, hypersensitivity pneumonitis, BOOP, sarcoidosis, chronic congestive heart failure, rheumatologic disorder induced interstitial lung disease. Optimal diagnosing will include rheumatological panel which would include ESR, TOYA, ANCA, ARNP, CCP, rheumatoid factor, hypersensitivity panel, JOE1, scleroderma antibodies, Sjogren syndrome antibodies; biopsy will be necessary to definitively determine the etiology unless the CT scan findings are specific for UIP. Therapy will be based on diagnostics. \par \par problem 3: PND Syndrome\par -add Olopatadine 0.6% 1 sniff BID \par Environmental measures for allergies were encouraged including mattress and pillow cover, air purifier, and environmental controls \par \par problem 4: poor balance\par -recommended to do balance therapy following her knee operation\par \par problem 5: r/o ?OSAS\par -recommended to get Home sleep study \par -can wait until after knee operation\par \par Sleep apnea is associated with adverse clinical consequences which an affect most organ systems. Cardiovascular disease risk includes arrhythmias, atrial fibrillation, hypertension, coronary artery disease, and stroke. Metabolic disorders include diabetes type 2, non-alcoholic fatty liver disease. Mood disorder especially depression; and cognitive decline especially in the elderly. Associations with chronic reflux/Chen’s esophagus some but not all inclusive. \par -Reasons include arousal consistent with hypopnea; respiratory events most prominent in REM sleep or supine position; therefore sleep staging and body position are important for accurate diagnosis and estimation of AHI.\par -Good sleep hygiene was encouraged including avoiding watching television an hour before bed, keeping caffeine at a low, avoiding reading, television, or anything, in bed, no drinking any liquids three hours before bedtime, and only getting into bed when tired and ready for sleep. \par \par problem 6: cardiac disease\par -2/6 systolic murmur noted\par -get Echocardiogram \par -recommended to continue to follow up with Cardiologist \par \par problem 7: poor breathing mechanics\par -Proper breathing techniques were reviewed with an emphasis of exhalation. Patient instructed to breath in for 1 second and out for four seconds. Patient was encouraged to not talk while walking. \par \par problem 8: over weight / out of shape\par -Weight loss, exercise, and diet control were discussed and are highly encouraged. Treatment options were given such as, aqua therapy, and contacting a nutritionist. Recommended to use the elliptical, stationary bike, less use of treadmill. Mindful eating was explained to the patient Obesity is associated with worsening asthma, shortness of breath, and potential for cardiac disease, diabetes, and other underlying medical conditions\par \par problem 9: pre-op pulmonary clearance\par -will need supplemental oxygen (3-5 liters) at time of surgery \par -for right knee surgery \par -at this point in time there are no absolute pulmonary contraindications to go forward with the planned procedure \par -at the time of surgery s/he should have optimal pain control, incentive spirometry, early ambulation, DVT and GI prophylaxis. \par \par problem 10: health maintenance \par -recommended yearly flu shot after October 15\par -recommended strep pneumonia vaccines: Prevnar-13 vaccine, followed by Pneumo vaccine 23 one year following\par -recommended early intervention for Upper Respiratory Infections (URIs)\par -recommended regular osteoporosis evaluations\par -recommended early dermatological evaluations\par -recommended after the age of 50 to the age of 70, colonoscopy every 5 years\par \par F/U in 6-8 weeks \par He She is encouraged to call with any changes, concerns, or questions

## 2018-11-01 NOTE — REASON FOR VISIT
[Initial Evaluation] : an initial evaluation [FreeTextEntry1] : SOB / fatigue / dry cough. Pulmonary Pre-Op clearance.

## 2018-11-01 NOTE — PROCEDURE
[FreeTextEntry1] : PFT - spi reveals mild restrictive dysfunction; FEV1 is 1.47 which is 91% of predicted, normal flow volume loop; 13% improvement with bronchodilators. Mild reduction in volumes, Severe reduction in diffusion; DLCO is 5.2, which is 31% of predicted. \par \par CXR reveals mild cardiomegaly; mild increased interstitial markings b/l. \par \par Chest XRay PA and Lateral (10/29/2018) reveals diffusely prominent interstitial markings with normal-sized heart and no evidence of effusion. Findings likely secondary to pulmonary fibrosis. if indicated, noncontrast chest CT could be obtained. Prominent main left pulmonary artery suggesting PAH.\par \par 6 minute walk test reveals a low saturation of 76% with no evidence of dyspnea or fatigue; walked 0.08 miles or 142.682meters - With oxygen \par \par 6 minute walk test reveals a low saturation of 69% with no evidence of dyspnea or fatigue; walked 0.08 miles or 142.682 meters - Without oxygen

## 2018-11-01 NOTE — PHYSICAL EXAM
[General Appearance - Well Developed] : well developed [Normal Appearance] : normal appearance [Well Groomed] : well groomed [General Appearance - Well Nourished] : well nourished [No Deformities] : no deformities [General Appearance - In No Acute Distress] : no acute distress [Normal Conjunctiva] : the conjunctiva exhibited no abnormalities [Eyelids - No Xanthelasma] : the eyelids demonstrated no xanthelasmas [Normal Oropharynx] : normal oropharynx [Neck Appearance] : the appearance of the neck was normal [Neck Cervical Mass (___cm)] : no neck mass was observed [Jugular Venous Distention Increased] : there was no jugular-venous distention [Thyroid Diffuse Enlargement] : the thyroid was not enlarged [Thyroid Nodule] : there were no palpable thyroid nodules [Heart Rate And Rhythm] : heart rate and rhythm were normal [Heart Sounds] : normal S1 and S2 [Respiration, Rhythm And Depth] : normal respiratory rhythm and effort [Exaggerated Use Of Accessory Muscles For Inspiration] : no accessory muscle use [Abdomen Soft] : soft [Abdomen Tenderness] : non-tender [Abdomen Mass (___ Cm)] : no abdominal mass palpated [Abnormal Walk] : normal gait [Gait - Sufficient For Exercise Testing] : the gait was sufficient for exercise testing [Nail Clubbing] : no clubbing of the fingernails [Cyanosis, Localized] : no localized cyanosis [Petechial Hemorrhages (___cm)] : no petechial hemorrhages [Skin Color & Pigmentation] : normal skin color and pigmentation [Skin Turgor] : normal skin turgor [] : no rash [Deep Tendon Reflexes (DTR)] : deep tendon reflexes were 2+ and symmetric [Sensation] : the sensory exam was normal to light touch and pinprick [No Focal Deficits] : no focal deficits [Oriented To Time, Place, And Person] : oriented to person, place, and time [Impaired Insight] : insight and judgment were intact [Affect] : the affect was normal [II] : II [Kyphosis] : kyphosis [FreeTextEntry1] : I:E ratio 1:3; mild crackles at bases

## 2018-11-01 NOTE — HISTORY OF PRESENT ILLNESS
[FreeTextEntry1] : Ms. Monzon is a 83 year old presenting to the office today for an initial evaluation. She is here for pre-op clearance for right knee surgery.  Her chief complaint is dry cough. \par -she states that she will frequently get short of breath while walking up steps\par -she notes that she does her exercises every morning, which will also make her short of breath occasionally\par -she states that she gets a frequent post nasal drip\par -she states that she has a persistent dry cough, which she believes is brought on by the grass and trees in the backyard, as well as her PND. she followed up with an allergist a few years ago noted she has a pollen allergy\par -she state that she will typically wake up 1-2 times during the night to use the bathroom\par -she reports that she is not sure if she snores\par -she states that she usually feels rested upon waking in the morning\par -she states that her memory and concentration are good\par -she notes that she would be able to fall asleep as a passenger in a car or while watching a boring TV show\par -she states that she takes Robitussin occasionally to help the cough\par -she notes that her energy levels are frequently poor\par -she reports that she has constant right knee pains\par -she states that she is very afraid of falling, as her balance is poor\par -she denies any headaches, nausea, vomiting, fever, chills, sweats, chest pain, chest pressure, diarrhea, constipation, dysphagia, dizziness, leg swelling, leg pain, itchy eyes, itchy ears, heartburn, reflux, or sour taste in the mouth, muscle aches or pains

## 2018-11-05 ENCOUNTER — RX CHANGE (OUTPATIENT)
Age: 83
End: 2018-11-05

## 2018-11-05 RX ORDER — FLUTICASONE FUROATE, UMECLIDINIUM BROMIDE AND VILANTEROL TRIFENATATE 100; 62.5; 25 UG/1; UG/1; UG/1
100-62.5-25 POWDER RESPIRATORY (INHALATION)
Qty: 3 | Refills: 1 | Status: DISCONTINUED | COMMUNITY
Start: 2018-11-01 | End: 2018-11-05

## 2018-11-06 ENCOUNTER — RX CHANGE (OUTPATIENT)
Age: 83
End: 2018-11-06

## 2018-11-06 RX ORDER — UMECLIDINIUM 62.5 UG/1
62.5 AEROSOL, POWDER ORAL
Qty: 3 | Refills: 1 | Status: DISCONTINUED | COMMUNITY
Start: 2018-11-05 | End: 2018-11-06

## 2018-11-06 RX ORDER — TIOTROPIUM BROMIDE INHALATION SPRAY 3.12 UG/1
2.5 SPRAY, METERED RESPIRATORY (INHALATION)
Qty: 3 | Refills: 1 | Status: ACTIVE | COMMUNITY
Start: 2018-11-06 | End: 1900-01-01

## 2018-11-06 RX ORDER — FLUTICASONE FUROATE AND VILANTEROL TRIFENATATE 200; 25 UG/1; UG/1
200-25 POWDER RESPIRATORY (INHALATION) DAILY
Qty: 3 | Refills: 1 | Status: DISCONTINUED | COMMUNITY
Start: 2018-11-05 | End: 2018-11-06

## 2018-11-12 ENCOUNTER — NON-APPOINTMENT (OUTPATIENT)
Age: 83
End: 2018-11-12

## 2018-11-12 ENCOUNTER — APPOINTMENT (OUTPATIENT)
Dept: CARDIOLOGY | Facility: CLINIC | Age: 83
End: 2018-11-12
Payer: MEDICARE

## 2018-11-12 VITALS
HEART RATE: 84 BPM | SYSTOLIC BLOOD PRESSURE: 126 MMHG | BODY MASS INDEX: 27.4 KG/M2 | DIASTOLIC BLOOD PRESSURE: 82 MMHG | WEIGHT: 145 LBS

## 2018-11-12 PROCEDURE — 93000 ELECTROCARDIOGRAM COMPLETE: CPT

## 2018-11-12 PROCEDURE — 99204 OFFICE O/P NEW MOD 45 MIN: CPT

## 2018-11-12 NOTE — PHYSICAL EXAM
[General Appearance - Well Developed] : well developed [Normal Appearance] : normal appearance [Well Groomed] : well groomed [General Appearance - Well Nourished] : well nourished [No Deformities] : no deformities [General Appearance - In No Acute Distress] : no acute distress [Normal Conjunctiva] : the conjunctiva exhibited no abnormalities [Eyelids - No Xanthelasma] : the eyelids demonstrated no xanthelasmas [Normal Oral Mucosa] : normal oral mucosa [No Oral Pallor] : no oral pallor [No Oral Cyanosis] : no oral cyanosis [Heart Rate And Rhythm] : heart rate and rhythm were normal [Heart Sounds] : normal S1 and S2 [Murmurs] : no murmurs present [Edema] : no peripheral edema present [Respiration, Rhythm And Depth] : normal respiratory rhythm and effort [Auscultation Breath Sounds / Voice Sounds] : lungs were clear to auscultation bilaterally [Chest Palpation] : palpation of the chest revealed no abnormalities [Lungs Percussion] : the lungs were normal to percussion [FreeTextEntry1] : coarse breath sounds [Abdomen Soft] : soft [Abdomen Tenderness] : non-tender [Abdomen Mass (___ Cm)] : no abdominal mass palpated [Abnormal Walk] : normal gait [Gait - Sufficient For Exercise Testing] : the gait was sufficient for exercise testing [Nail Clubbing] : no clubbing of the fingernails [Cyanosis, Localized] : no localized cyanosis [Petechial Hemorrhages (___cm)] : no petechial hemorrhages [Skin Color & Pigmentation] : normal skin color and pigmentation [] : no rash [No Venous Stasis] : no venous stasis [Skin Lesions] : no skin lesions [No Skin Ulcers] : no skin ulcer [No Xanthoma] : no  xanthoma was observed [Oriented To Time, Place, And Person] : oriented to person, place, and time [Affect] : the affect was normal [Mood] : the mood was normal [No Anxiety] : not feeling anxious

## 2018-11-12 NOTE — REVIEW OF SYSTEMS
[Shortness Of Breath] : shortness of breath [Chest Pain] : no chest pain [Palpitations] : no palpitations [Negative] : Psychiatric

## 2018-11-12 NOTE — DISCUSSION/SUMMARY
[FreeTextEntry1] : 83F with signs of COPD, ILD, worsening dyspnea on exertion\par \par -may be pulmonary in origin.  Will check echo to rule out LV dysfunction, valvular dysfunction.  \par Consider pharm stress test thereafter.  \par \par RV 3 months

## 2018-11-16 ENCOUNTER — APPOINTMENT (OUTPATIENT)
Dept: INTERNAL MEDICINE | Facility: CLINIC | Age: 83
End: 2018-11-16
Payer: MEDICARE

## 2018-11-16 PROCEDURE — 93306 TTE W/DOPPLER COMPLETE: CPT

## 2018-11-28 ENCOUNTER — RESULT REVIEW (OUTPATIENT)
Age: 83
End: 2018-11-28

## 2018-12-12 ENCOUNTER — OUTPATIENT (OUTPATIENT)
Dept: OUTPATIENT SERVICES | Facility: HOSPITAL | Age: 83
LOS: 1 days | End: 2018-12-12
Payer: MEDICARE

## 2018-12-12 VITALS
HEART RATE: 90 BPM | TEMPERATURE: 98 F | OXYGEN SATURATION: 93 % | SYSTOLIC BLOOD PRESSURE: 132 MMHG | WEIGHT: 141.98 LBS | DIASTOLIC BLOOD PRESSURE: 63 MMHG | HEIGHT: 62 IN | RESPIRATION RATE: 18 BRPM

## 2018-12-12 DIAGNOSIS — I27.29 OTHER SECONDARY PULMONARY HYPERTENSION: ICD-10-CM

## 2018-12-12 DIAGNOSIS — Z96.659 PRESENCE OF UNSPECIFIED ARTIFICIAL KNEE JOINT: Chronic | ICD-10-CM

## 2018-12-12 LAB
ALBUMIN SERPL ELPH-MCNC: 3.2 G/DL — LOW (ref 3.3–5)
ALP SERPL-CCNC: 119 U/L — SIGNIFICANT CHANGE UP (ref 40–120)
ALT FLD-CCNC: 11 U/L — SIGNIFICANT CHANGE UP (ref 10–45)
ANION GAP SERPL CALC-SCNC: 12 MMOL/L — SIGNIFICANT CHANGE UP (ref 5–17)
AST SERPL-CCNC: 21 U/L — SIGNIFICANT CHANGE UP (ref 10–40)
BILIRUB SERPL-MCNC: 0.3 MG/DL — SIGNIFICANT CHANGE UP (ref 0.2–1.2)
BUN SERPL-MCNC: 26 MG/DL — HIGH (ref 7–23)
CALCIUM SERPL-MCNC: 7.4 MG/DL — LOW (ref 8.4–10.5)
CHLORIDE SERPL-SCNC: 110 MMOL/L — HIGH (ref 96–108)
CO2 SERPL-SCNC: 19 MMOL/L — LOW (ref 22–31)
CREAT SERPL-MCNC: 1.29 MG/DL — SIGNIFICANT CHANGE UP (ref 0.5–1.3)
GLUCOSE SERPL-MCNC: 103 MG/DL — HIGH (ref 70–99)
HCT VFR BLD CALC: 40.8 % — SIGNIFICANT CHANGE UP (ref 34.5–45)
HGB BLD-MCNC: 13.8 G/DL — SIGNIFICANT CHANGE UP (ref 11.5–15.5)
MCHC RBC-ENTMCNC: 28.6 PG — SIGNIFICANT CHANGE UP (ref 27–34)
MCHC RBC-ENTMCNC: 33.9 GM/DL — SIGNIFICANT CHANGE UP (ref 32–36)
MCV RBC AUTO: 84.4 FL — SIGNIFICANT CHANGE UP (ref 80–100)
PLATELET # BLD AUTO: 354 K/UL — SIGNIFICANT CHANGE UP (ref 150–400)
POTASSIUM SERPL-MCNC: 3.2 MMOL/L — LOW (ref 3.5–5.3)
POTASSIUM SERPL-SCNC: 3.2 MMOL/L — LOW (ref 3.5–5.3)
PROT SERPL-MCNC: 6.7 G/DL — SIGNIFICANT CHANGE UP (ref 6–8.3)
RBC # BLD: 4.84 M/UL — SIGNIFICANT CHANGE UP (ref 3.8–5.2)
RBC # FLD: 12.7 % — SIGNIFICANT CHANGE UP (ref 10.3–14.5)
SODIUM SERPL-SCNC: 141 MMOL/L — SIGNIFICANT CHANGE UP (ref 135–145)
WBC # BLD: 10.7 K/UL — HIGH (ref 3.8–10.5)
WBC # FLD AUTO: 10.7 K/UL — HIGH (ref 3.8–10.5)

## 2018-12-12 PROCEDURE — 93306 TTE W/DOPPLER COMPLETE: CPT

## 2018-12-12 PROCEDURE — C1894: CPT

## 2018-12-12 PROCEDURE — 93010 ELECTROCARDIOGRAM REPORT: CPT

## 2018-12-12 PROCEDURE — 93451 RIGHT HEART CATH: CPT | Mod: 26,GC

## 2018-12-12 PROCEDURE — 93005 ELECTROCARDIOGRAM TRACING: CPT

## 2018-12-12 PROCEDURE — 76937 US GUIDE VASCULAR ACCESS: CPT

## 2018-12-12 PROCEDURE — 80053 COMPREHEN METABOLIC PANEL: CPT

## 2018-12-12 PROCEDURE — 93451 RIGHT HEART CATH: CPT

## 2018-12-12 PROCEDURE — 85027 COMPLETE CBC AUTOMATED: CPT

## 2018-12-12 PROCEDURE — 76937 US GUIDE VASCULAR ACCESS: CPT | Mod: 26,GC

## 2018-12-12 PROCEDURE — 99152 MOD SED SAME PHYS/QHP 5/>YRS: CPT

## 2018-12-12 PROCEDURE — 99152 MOD SED SAME PHYS/QHP 5/>YRS: CPT | Mod: GC

## 2018-12-12 PROCEDURE — 99223 1ST HOSP IP/OBS HIGH 75: CPT | Mod: 25

## 2018-12-12 PROCEDURE — 93306 TTE W/DOPPLER COMPLETE: CPT | Mod: 26

## 2018-12-12 RX ORDER — PREGABALIN 225 MG/1
1 CAPSULE ORAL
Qty: 0 | Refills: 0 | COMMUNITY

## 2018-12-12 RX ORDER — POTASSIUM BICARBONATE 978 MG/1
25 TABLET, EFFERVESCENT ORAL
Qty: 0 | Refills: 0 | Status: DISCONTINUED | OUTPATIENT
Start: 2018-12-12 | End: 2018-12-27

## 2018-12-12 RX ORDER — POTASSIUM CHLORIDE 20 MEQ
20 PACKET (EA) ORAL
Qty: 0 | Refills: 0 | Status: DISCONTINUED | OUTPATIENT
Start: 2018-12-12 | End: 2018-12-12

## 2018-12-12 RX ORDER — CHOLECALCIFEROL (VITAMIN D3) 125 MCG
1 CAPSULE ORAL
Qty: 0 | Refills: 0 | COMMUNITY

## 2018-12-12 NOTE — H&P CARDIOLOGY - FAMILY HISTORY
No pertinent family history in first degree relatives Mother  Still living? Unknown  Family history of malignant neoplasm, Age at diagnosis: Age Unknown

## 2018-12-12 NOTE — H&P CARDIOLOGY - PMH
COPD (chronic obstructive pulmonary disease)    HTN (hypertension)    Seasonal allergies COPD (chronic obstructive pulmonary disease)    Former smoker    HTN (hypertension)    Seasonal allergies

## 2018-12-12 NOTE — H&P CARDIOLOGY - HISTORY OF PRESENT ILLNESS
This is an 82 yo  female PMH HTN, OA, former smoker who presented to Dr. Horowitz for worsening intermittent shortness of breath and cough diagnosed likely COPD/component of interstitial lung disease noted to have desaturation to 60s with walking.  Pt. endorses dyspnea worse with exertion.  Denies chest pain/pressure, dizziness, diaphoresis, palpitations, nausea, vomiting, peripheral edema, recent weight gain, or syncope.  Pt. presents asymptomatic for further evaluation and RHC. This is an 84 yo  female PMH HTN, OA, former smoker who presented to Dr. Horowitz for worsening intermittent shortness of breath and cough diagnosed likely COPD/component of interstitial lung disease noted to have desaturation to 60s with walking.  Pt. endorses dyspnea worse with exertion.  Denies chest pain/pressure, dizziness, diaphoresis, palpitations, nausea, vomiting, peripheral edema, recent weight gain, or syncope.  CT chest 11/27/18 chronic interstitial lung disease with interstitial fibrosis noted, nodularity along minor and posterior right major fissure, rule out sarcoidosis. TTE 11/16/18 EF 70-75, Grade I diastolic dysfunction, MAC, moderate PHTN. Pt. presents asymptomatic for further evaluation and RHC.

## 2018-12-18 ENCOUNTER — TRANSCRIPTION ENCOUNTER (OUTPATIENT)
Age: 83
End: 2018-12-18

## 2019-01-03 PROBLEM — Z87.891 PERSONAL HISTORY OF NICOTINE DEPENDENCE: Chronic | Status: ACTIVE | Noted: 2018-12-12

## 2019-01-03 PROBLEM — J44.9 CHRONIC OBSTRUCTIVE PULMONARY DISEASE, UNSPECIFIED: Chronic | Status: ACTIVE | Noted: 2018-12-12

## 2019-01-10 PROBLEM — R59.0 MEDIASTINAL LYMPHADENOPATHY: Status: ACTIVE | Noted: 2019-01-10

## 2019-01-14 ENCOUNTER — APPOINTMENT (OUTPATIENT)
Dept: THORACIC SURGERY | Facility: CLINIC | Age: 84
End: 2019-01-14
Payer: MEDICARE

## 2019-01-14 VITALS
TEMPERATURE: 97.8 F | HEIGHT: 60 IN | HEART RATE: 98 BPM | DIASTOLIC BLOOD PRESSURE: 74 MMHG | RESPIRATION RATE: 17 BRPM | OXYGEN SATURATION: 90 % | SYSTOLIC BLOOD PRESSURE: 119 MMHG | BODY MASS INDEX: 27.48 KG/M2 | WEIGHT: 140 LBS

## 2019-01-14 DIAGNOSIS — R59.0 LOCALIZED ENLARGED LYMPH NODES: ICD-10-CM

## 2019-01-14 PROCEDURE — 99205 OFFICE O/P NEW HI 60 MIN: CPT

## 2019-01-14 RX ORDER — DICLOFENAC SODIUM 75 MG/1
75 TABLET, DELAYED RELEASE ORAL
Qty: 60 | Refills: 0 | Status: DISCONTINUED | COMMUNITY
Start: 2017-12-15 | End: 2019-01-14

## 2019-01-14 RX ORDER — DICLOFENAC SODIUM 75 MG/1
75 TABLET, DELAYED RELEASE ORAL
Qty: 1 | Refills: 1 | Status: DISCONTINUED | COMMUNITY
Start: 2018-09-26 | End: 2019-01-14

## 2019-01-14 RX ORDER — FLUTICASONE PROPIONATE AND SALMETEROL 232; 14 UG/1; UG/1
232-14 POWDER, METERED RESPIRATORY (INHALATION) TWICE DAILY
Qty: 3 | Refills: 1 | Status: DISCONTINUED | COMMUNITY
Start: 2018-11-06 | End: 2019-01-14

## 2019-01-14 RX ORDER — OLOPATADINE HYDROCHLORIDE 665 UG/1
0.6 SPRAY, METERED NASAL
Qty: 3 | Refills: 5 | Status: DISCONTINUED | COMMUNITY
Start: 2018-11-01 | End: 2019-01-14

## 2019-01-15 RX ORDER — BUDESONIDE AND FORMOTEROL FUMARATE DIHYDRATE 160; 4.5 UG/1; UG/1
160-4.5 AEROSOL RESPIRATORY (INHALATION) TWICE DAILY
Qty: 1 | Refills: 1 | Status: ACTIVE | COMMUNITY
Start: 2019-01-15 | End: 1900-01-01

## 2019-01-16 ENCOUNTER — APPOINTMENT (OUTPATIENT)
Dept: ORTHOPEDIC SURGERY | Facility: CLINIC | Age: 84
End: 2019-01-16
Payer: MEDICARE

## 2019-01-16 DIAGNOSIS — M25.562 PAIN IN LEFT KNEE: ICD-10-CM

## 2019-01-16 PROCEDURE — 73564 X-RAY EXAM KNEE 4 OR MORE: CPT | Mod: RT

## 2019-01-16 PROCEDURE — 73562 X-RAY EXAM OF KNEE 3: CPT | Mod: LT

## 2019-01-16 PROCEDURE — 99213 OFFICE O/P EST LOW 20 MIN: CPT | Mod: 25

## 2019-01-16 PROCEDURE — 20610 DRAIN/INJ JOINT/BURSA W/O US: CPT | Mod: RT

## 2019-01-16 RX ORDER — DICLOFENAC SODIUM 75 MG/1
75 TABLET, DELAYED RELEASE ORAL
Qty: 1 | Refills: 1 | Status: ACTIVE | COMMUNITY
Start: 2019-01-16 | End: 1900-01-01

## 2019-01-17 NOTE — DATA REVIEWED
[FreeTextEntry1] : PFT on 11/1/18: FVC 67%, FEV1 91%, DLCO 31%\par \par CT chest on 11/24/18:\par - multiple prominent mediastinal LNs, 1.6 x 1.7 cm pretracheal/retrocaval LN. \par - Lt para-aortic LN, 1.5 cm and 2.6 x 1.3 cm \par - subcarinal LN 2.3 x 2 cm\par - bilateral hilar prominence noted mat be related to prominent centralpulmonary arteries vs lymphadenopathy\par - diffuse upper and lower lobe increased reticular markings seen predominantly in subpleural region\par - nodularity along the minor fissure and posteriorly in the Rt major fissure\par - 2.6 cm water density mass in Rt lobe of liver\par - partially visualized water density masses of both kidneys

## 2019-01-17 NOTE — ASSESSMENT
[FreeTextEntry1] : 84 y/o female referred by her pulmonologist Dr. Horowitz for evaluation of mediastinal lymphadenopathy and lung nodules.  She is a former smoker (smoked 1/2 PPD x 40 years, quit in 2004), with hx of COPD, ILD, Post Nasal Drip, HTN, RA, and OA, s/p L TKR (Aug 2017).  She is awaiting right TKR, and was referred to pulmonary and cardiology for preop evaluation.  Recent cardiac catheterization and Echocardiogram were performed.  \par \par PFT on 11/1/18: \par FVC- Pre- 1.50 (67%), Post- 1.66 (74%)\par FEV1- Pre 1.47 (91%), Post- 1.66 (103%)\par DLCO- 5.2 (31%)\par \par CT chest on 11/24/18:\par - multiple prominent mediastinal LNs seen, 1.6 x 1.7 cm pretracheal/retrocaval LN. \par - Lt para-aortic lymph nodes of 15mm and 2.6 x 1.3 cm \par - subcarinal LN 2.3 x 2 cm\par - bilateral hilar prominence noted mat be related to prominent central pulmonary arteries but superimposed lymphadenopathy is also a consideration\par - diffuse upper and lower lobe increased reticular markings seen predominantly in subpleural regions\par - nodularity along the minor fissure and posteriorly in the Rt major fissure\par - 2.6 cm water density mass in Rt lobe of liver\par - partially visualized water density masses of both kidneys\par \par I have reviewed the patient's medical records and diagnostic images at time of this office consultation and have made the following recommendation:\par 1.  I would like to schedule her for right VATS, lung biopsy.\par 2.  The risks, benefits, and alternatives to the procedure were discussed with the patient at length. She verbalized understanding, and are in agreement with the above treatment plan.\par  3.  Prior to surgery, she will require medical and cardiac clearance.\par \par Written by Sofie Gore NP, acting as a scribe for Janice Singleton MD.\par \par The documentation recorded by the scribe accurately reflects the service I personally performed and the decisions made by me. JANICE SINGLETON MD\par  \par

## 2019-01-17 NOTE — HISTORY OF PRESENT ILLNESS
[FreeTextEntry1] : Lavonne Monzon is an 84 y/o female referred by her pulmonologist Dr. Horowitz for evaluation of mediastinal lymphadenopathy and lung nodules.  She is a former smoker (smoked 1/2 PPD x 40 years, quit in 2004), with hx of COPD, ILD, Post Nasal Drip, HTN, RA, and OA, s/p L TKR (Aug 2017).  She is awaiting right TKR, and was referred to pulmonary and cardiology for preop evaluation.  Recent cardiac catheterization and Echocardiogram were performed.  \par \par PFT on 11/1/18: FVC 67%, FEV1 91%, DLCO 31%\par \par CT chest on 11/24/18:\par - multiple prominent mediastinal LNs seen, 1.6 x 1.7 cm pretracheal/retrocaval LN. \par - Lt para-aortic lymph nodes of 15mm and 2.6 x 1.3 cm \par - subcarinal LN 2.3 x 2 cm\par - bilateral hilar prominence noted mat be related to prominent central pulmonary arteries but superimposed lymphadenopathy is also a consideration\par - diffuse upper and lower lobe increased reticular markings seen predominantly in subpleural regions\par - nodularity along the minor fissure and posteriorly in the Rt major fissure\par - 2.6 cm water density mass in Rt lobe of liver\par - partially visualized water density masses of both kidneys\par \par She complains of worsening shortness of breath for the past few months.  She also reports having a recent cold with runny nose, sneezing and cough which is productive of white phlegm.  She denies any fever, chills, chest pain, hemoptysis, or recent illness. \par \par Pt is here today for a initial thoracic surgery consultation and is accompanied by her son.\par

## 2019-01-17 NOTE — REVIEW OF SYSTEMS
[Feeling Tired] : feeling tired [Cough] : cough [SOB on Exertion] : shortness of breath during exertion [Arthralgias] : arthralgias [Joint Pain] : joint pain [Joint Stiffness] : joint stiffness [Negative] : Heme/Lymph [Fever] : no fever [Chills] : no chills [Feeling Poorly] : not feeling poorly [Recent Weight Gain (___ Lbs)] : no recent weight gain [Recent Weight Loss (___ Lbs)] : no recent weight loss [Shortness Of Breath] : no shortness of breath [Wheezing] : no wheezing [Orthopnea] : no orthopnea [PND] : no PND [FreeTextEntry4] : hx postnasal drip [FreeTextEntry9] : right knee pain/stiffness, ambulates with cane

## 2019-01-17 NOTE — PHYSICAL EXAM
[General Appearance - Alert] : alert [General Appearance - In No Acute Distress] : in no acute distress [General Appearance - Well Nourished] : well nourished [General Appearance - Well Developed] : well developed [Sclera] : the sclera and conjunctiva were normal [Outer Ear] : the ears and nose were normal in appearance [Hearing Threshold Finger Rub Not [place]] : hearing was normal [Neck Appearance] : the appearance of the neck was normal [] : the neck was supple [Neck Cervical Mass (___cm)] : no neck mass was observed [Respiration, Rhythm And Depth] : normal respiratory rhythm and effort [Auscultation Breath Sounds / Voice Sounds] : lungs were clear to auscultation bilaterally [Heart Rate And Rhythm] : heart rate was normal and rhythm regular [Heart Sounds] : normal S1 and S2 [Examination Of The Chest] : the chest was normal in appearance [Chest Visual Inspection Thoracic Asymmetry] : no chest asymmetry [Diminished Respiratory Excursion] : normal chest expansion [Abdomen Soft] : soft [Abdomen Tenderness] : non-tender [Cervical Lymph Nodes Enlarged Posterior Bilaterally] : posterior cervical [Cervical Lymph Nodes Enlarged Anterior Bilaterally] : anterior cervical [Supraclavicular Lymph Nodes Enlarged Bilaterally] : supraclavicular [No CVA Tenderness] : no ~M costovertebral angle tenderness [No Spinal Tenderness] : no spinal tenderness [Skin Color & Pigmentation] : normal skin color and pigmentation [No Focal Deficits] : no focal deficits [Oriented To Time, Place, And Person] : oriented to person, place, and time [Impaired Insight] : insight and judgment were intact [Affect] : the affect was normal [FreeTextEntry1] : limping gait, ambulates with quad cane

## 2019-01-17 NOTE — CONSULT LETTER
[Dear  ___] : Dear  [unfilled], [Consult Letter:] : I had the pleasure of evaluating your patient, [unfilled]. [( Thank you for referring [unfilled] for consultation for _____ )] : Thank you for referring [unfilled] for consultation for [unfilled] [Please see my note below.] : Please see my note below. [Consult Closing:] : Thank you very much for allowing me to participate in the care of this patient.  If you have any questions, please do not hesitate to contact me. [Sincerely,] : Sincerely, [FreeTextEntry2] : Dr. Zhao Horowitz (pulm/ref)\par Dr. Zhao Ferrer(cardio)\par Dr. Darian Beckham (PCP) [FreeTextEntry3] : Tushar Lewis MD\par Attending Surgeon\par Division of Thoracic Surgery\par , [facility] at [facility]\par

## 2019-02-04 ENCOUNTER — NON-APPOINTMENT (OUTPATIENT)
Age: 84
End: 2019-02-04

## 2019-02-04 ENCOUNTER — APPOINTMENT (OUTPATIENT)
Dept: CARDIOLOGY | Facility: CLINIC | Age: 84
End: 2019-02-04
Payer: MEDICARE

## 2019-02-04 ENCOUNTER — APPOINTMENT (OUTPATIENT)
Dept: CARDIOLOGY | Facility: CLINIC | Age: 84
End: 2019-02-04

## 2019-02-04 VITALS
SYSTOLIC BLOOD PRESSURE: 138 MMHG | DIASTOLIC BLOOD PRESSURE: 74 MMHG | HEART RATE: 110 BPM | BODY MASS INDEX: 26.95 KG/M2 | WEIGHT: 138 LBS

## 2019-02-04 DIAGNOSIS — E78.00 PURE HYPERCHOLESTEROLEMIA, UNSPECIFIED: ICD-10-CM

## 2019-02-04 PROCEDURE — 99214 OFFICE O/P EST MOD 30 MIN: CPT

## 2019-02-04 PROCEDURE — 93000 ELECTROCARDIOGRAM COMPLETE: CPT

## 2019-02-04 NOTE — PHYSICAL EXAM
[General Appearance - Well Developed] : well developed [Normal Appearance] : normal appearance [Well Groomed] : well groomed [General Appearance - Well Nourished] : well nourished [No Deformities] : no deformities [General Appearance - In No Acute Distress] : no acute distress [Normal Conjunctiva] : the conjunctiva exhibited no abnormalities [Eyelids - No Xanthelasma] : the eyelids demonstrated no xanthelasmas [Normal Oral Mucosa] : normal oral mucosa [No Oral Pallor] : no oral pallor [No Oral Cyanosis] : no oral cyanosis [Respiration, Rhythm And Depth] : normal respiratory rhythm and effort [Auscultation Breath Sounds / Voice Sounds] : lungs were clear to auscultation bilaterally [Chest Palpation] : palpation of the chest revealed no abnormalities [Lungs Percussion] : the lungs were normal to percussion [Heart Rate And Rhythm] : heart rate and rhythm were normal [Heart Sounds] : normal S1 and S2 [Murmurs] : no murmurs present [Edema] : no peripheral edema present [Abdomen Soft] : soft [Abdomen Tenderness] : non-tender [Abdomen Mass (___ Cm)] : no abdominal mass palpated [Abnormal Walk] : normal gait [Gait - Sufficient For Exercise Testing] : the gait was sufficient for exercise testing [Nail Clubbing] : no clubbing of the fingernails [Cyanosis, Localized] : no localized cyanosis [Petechial Hemorrhages (___cm)] : no petechial hemorrhages [Skin Color & Pigmentation] : normal skin color and pigmentation [] : no rash [No Venous Stasis] : no venous stasis [Skin Lesions] : no skin lesions [No Skin Ulcers] : no skin ulcer [No Xanthoma] : no  xanthoma was observed [Oriented To Time, Place, And Person] : oriented to person, place, and time [Affect] : the affect was normal [Mood] : the mood was normal [No Anxiety] : not feeling anxious [FreeTextEntry1] : coarse breath sounds

## 2019-02-04 NOTE — DISCUSSION/SUMMARY
[FreeTextEntry1] : 83F with pHTN, likely secondary to underlying COPD/ILD, underlying diastolic dysfunction.  Pending VATS biopsy for lymphadenopathy. \par \par Patient with some mild shortness of breath with activity, but overall is able to perform >4 METS of activity without limitation. Her shortness of breath is secondary to her underlying respiratory disease.  While she did exhibit some diastolic dysfunction on echo, she has no signs or symptoms of decompensated heart failure by history or on exam.  No evidence of ongoing ischemia, arrhythmia, valvular heart disease or decompensated heart failure. Her pulmonary hypertension puts her at an elevated risk for surgery, but she is optimized from a cardiac standpoint for this intermediate risk surgery. No further cardiac testing is necessary prior to surgery.  Blood pressure management per anesthesia. \par

## 2019-02-04 NOTE — REVIEW OF SYSTEMS
[Shortness Of Breath] : shortness of breath [Negative] : Psychiatric [Chest Pain] : no chest pain [Palpitations] : no palpitations

## 2019-02-04 NOTE — HISTORY OF PRESENT ILLNESS
[FreeTextEntry1] : 83F with HTN, COPD/ILD, who presents for evaluation for follow up of shortness of breath, cough. Pt noted on echo with pHTN (53 mmHg), went for RHC per Dr. Horowitz to assess for candidacy for pulmonary vasodilators. RHC confirmed PASP of 50 mm Hg, TTE done same day with PASP 60 mmHg. \par \par In the meantime, patient is pending R VATS, lung biopsy for lymphadenopathy with Dr. Lewis.  Presents for cardiac evaluation. Overall pt feels well without chest pain. Notes mild shortness of breath with climbing 20 stairs, but she is able to do it. Her shortness of breath is most notable when she tries to do much too fast, or where she is exposed to cold air.. Denies LE edema, orthopnea, PND, weight gain. No palpitations.  Also notes chronic R knee pain secondary to OA and is considering a TKR. \par \par Worked for a bank. Former smoker (quit 15 years ago). Brother just had CABG at 74. \par \par ECG: SR with BRODIE, PRWP, LAFB\par TTE: 12/2018: Normal LV function, mild DD, mildly decreased RV function, Moderate pHTN (60 mmHg), mild MR\par RHC: PASP 50, PCWP 19

## 2019-02-07 ENCOUNTER — OUTPATIENT (OUTPATIENT)
Dept: OUTPATIENT SERVICES | Facility: HOSPITAL | Age: 84
LOS: 1 days | End: 2019-02-07

## 2019-02-07 VITALS
DIASTOLIC BLOOD PRESSURE: 68 MMHG | SYSTOLIC BLOOD PRESSURE: 130 MMHG | WEIGHT: 139.99 LBS | HEIGHT: 61 IN | OXYGEN SATURATION: 88 % | RESPIRATION RATE: 14 BRPM | HEART RATE: 84 BPM | TEMPERATURE: 97 F

## 2019-02-07 DIAGNOSIS — R91.1 SOLITARY PULMONARY NODULE: ICD-10-CM

## 2019-02-07 DIAGNOSIS — Z96.659 PRESENCE OF UNSPECIFIED ARTIFICIAL KNEE JOINT: Chronic | ICD-10-CM

## 2019-02-07 LAB
ANION GAP SERPL CALC-SCNC: 14 MMO/L — SIGNIFICANT CHANGE UP (ref 7–14)
BLD GP AB SCN SERPL QL: NEGATIVE — SIGNIFICANT CHANGE UP
BUN SERPL-MCNC: 22 MG/DL — SIGNIFICANT CHANGE UP (ref 7–23)
CALCIUM SERPL-MCNC: 9.8 MG/DL — SIGNIFICANT CHANGE UP (ref 8.4–10.5)
CHLORIDE SERPL-SCNC: 97 MMOL/L — LOW (ref 98–107)
CO2 SERPL-SCNC: 24 MMOL/L — SIGNIFICANT CHANGE UP (ref 22–31)
CREAT SERPL-MCNC: 1.26 MG/DL — SIGNIFICANT CHANGE UP (ref 0.5–1.3)
GLUCOSE SERPL-MCNC: 98 MG/DL — SIGNIFICANT CHANGE UP (ref 70–99)
HCT VFR BLD CALC: 43.8 % — SIGNIFICANT CHANGE UP (ref 34.5–45)
HGB BLD-MCNC: 14.1 G/DL — SIGNIFICANT CHANGE UP (ref 11.5–15.5)
MCHC RBC-ENTMCNC: 27.2 PG — SIGNIFICANT CHANGE UP (ref 27–34)
MCHC RBC-ENTMCNC: 32.2 % — SIGNIFICANT CHANGE UP (ref 32–36)
MCV RBC AUTO: 84.4 FL — SIGNIFICANT CHANGE UP (ref 80–100)
NRBC # FLD: 0 K/UL — LOW (ref 25–125)
PLATELET # BLD AUTO: 361 K/UL — SIGNIFICANT CHANGE UP (ref 150–400)
PMV BLD: 9.3 FL — SIGNIFICANT CHANGE UP (ref 7–13)
POTASSIUM SERPL-MCNC: 4.3 MMOL/L — SIGNIFICANT CHANGE UP (ref 3.5–5.3)
POTASSIUM SERPL-SCNC: 4.3 MMOL/L — SIGNIFICANT CHANGE UP (ref 3.5–5.3)
RBC # BLD: 5.19 M/UL — SIGNIFICANT CHANGE UP (ref 3.8–5.2)
RBC # FLD: 13.4 % — SIGNIFICANT CHANGE UP (ref 10.3–14.5)
RH IG SCN BLD-IMP: POSITIVE — SIGNIFICANT CHANGE UP
SODIUM SERPL-SCNC: 135 MMOL/L — SIGNIFICANT CHANGE UP (ref 135–145)
WBC # BLD: 11.19 K/UL — HIGH (ref 3.8–10.5)
WBC # FLD AUTO: 11.19 K/UL — HIGH (ref 3.8–10.5)

## 2019-02-07 RX ORDER — DICLOFENAC SODIUM 75 MG/1
1 TABLET, DELAYED RELEASE ORAL
Qty: 0 | Refills: 0 | COMMUNITY

## 2019-02-07 RX ORDER — FEXOFENADINE HCL 30 MG
1 TABLET ORAL
Qty: 0 | Refills: 0 | COMMUNITY

## 2019-02-07 RX ORDER — LOSARTAN/HYDROCHLOROTHIAZIDE 100MG-25MG
1 TABLET ORAL
Qty: 0 | Refills: 0 | COMMUNITY

## 2019-02-07 NOTE — H&P PST ADULT - NSANTHOSAYNRD_GEN_A_CORE
No. COLE screening performed.  STOP BANG Legend: 0-2 = LOW Risk; 3-4 = INTERMEDIATE Risk; 5-8 = HIGH Risk

## 2019-02-07 NOTE — H&P PST ADULT - FAMILY HISTORY
Mother  Still living? No  Family history of benign neoplasm of kidney excluding renal pelvis, Age at diagnosis: Age Unknown     Father  Still living? No  Paternal family history of emphysema, Age at diagnosis: Age Unknown

## 2019-02-07 NOTE — H&P PST ADULT - PROBLEM SELECTOR PLAN 1
Pt. is scheduled for a right VATs, lung biopsy 2/21/19.  Pt. had cardiac evaluation 2/4/19 as requested by the Surgeon. Pt. is scheduled for a right VATs, thoracoscopy lung biopsy 2/21/19.  Pt. had cardiac evaluation 2/4/19 as requested by the Surgeon.

## 2019-02-07 NOTE — H&P PST ADULT - MALLAMPATI CLASS
Class II - visualization of the soft palate, fauces, and uvula Class III - visualization of the soft palate and the base of the uvula/on phonation

## 2019-02-07 NOTE — H&P PST ADULT - PMH
COPD (chronic obstructive pulmonary disease)    Former smoker    HTN (hypertension)    OA (osteoarthritis)    RA (rheumatoid arthritis)    Seasonal allergies COPD (chronic obstructive pulmonary disease)    Former smoker    HTN (hypertension)    Lung nodule    OA (osteoarthritis)    RA (rheumatoid arthritis)    Seasonal allergies

## 2019-02-07 NOTE — H&P PST ADULT - ASSESSMENT
Pt. is an 84 yo female accompanied by her daughter.  Pt. has CONWAY.  She states she had a sample inhaler that made her feel better and that she could not afford the prescription inhaler.  Pt. has an O2 sat. of 88%.  She does not have any wheezing.  Informed Dr. Santana.  Instructed pt. to obtain pulmonary evaluation.

## 2019-02-11 ENCOUNTER — APPOINTMENT (OUTPATIENT)
Dept: PULMONOLOGY | Facility: CLINIC | Age: 84
End: 2019-02-11
Payer: MEDICARE

## 2019-02-11 VITALS
HEART RATE: 86 BPM | SYSTOLIC BLOOD PRESSURE: 118 MMHG | BODY MASS INDEX: 27.68 KG/M2 | RESPIRATION RATE: 17 BRPM | DIASTOLIC BLOOD PRESSURE: 65 MMHG | HEIGHT: 60 IN | WEIGHT: 141 LBS | OXYGEN SATURATION: 90 %

## 2019-02-11 DIAGNOSIS — J84.9 INTERSTITIAL PULMONARY DISEASE, UNSPECIFIED: ICD-10-CM

## 2019-02-11 DIAGNOSIS — I27.20 PULMONARY HYPERTENSION, UNSPECIFIED: ICD-10-CM

## 2019-02-11 DIAGNOSIS — E66.3 OVERWEIGHT: ICD-10-CM

## 2019-02-11 DIAGNOSIS — R06.83 SNORING: ICD-10-CM

## 2019-02-11 DIAGNOSIS — Z01.811 ENCOUNTER FOR PREPROCEDURAL RESPIRATORY EXAMINATION: ICD-10-CM

## 2019-02-11 DIAGNOSIS — R91.8 OTHER NONSPECIFIC ABNORMAL FINDING OF LUNG FIELD: ICD-10-CM

## 2019-02-11 PROCEDURE — 94729 DIFFUSING CAPACITY: CPT

## 2019-02-11 PROCEDURE — 94727 GAS DIL/WSHOT DETER LNG VOL: CPT

## 2019-02-11 PROCEDURE — 94060 EVALUATION OF WHEEZING: CPT

## 2019-02-11 PROCEDURE — 99214 OFFICE O/P EST MOD 30 MIN: CPT | Mod: 25

## 2019-02-11 RX ORDER — UMECLIDINIUM BROMIDE AND VILANTEROL TRIFENATATE 62.5; 25 UG/1; UG/1
62.5-25 POWDER RESPIRATORY (INHALATION) DAILY
Qty: 3 | Refills: 1 | Status: ACTIVE | COMMUNITY
Start: 2019-02-11 | End: 1900-01-01

## 2019-02-11 NOTE — PHYSICAL EXAM
[General Appearance - Well Developed] : well developed [Normal Appearance] : normal appearance [Well Groomed] : well groomed [General Appearance - Well Nourished] : well nourished [No Deformities] : no deformities [General Appearance - In No Acute Distress] : no acute distress [Normal Conjunctiva] : the conjunctiva exhibited no abnormalities [Eyelids - No Xanthelasma] : the eyelids demonstrated no xanthelasmas [Normal Oropharynx] : normal oropharynx [II] : II [Neck Appearance] : the appearance of the neck was normal [Neck Cervical Mass (___cm)] : no neck mass was observed [Jugular Venous Distention Increased] : there was no jugular-venous distention [Thyroid Diffuse Enlargement] : the thyroid was not enlarged [Thyroid Nodule] : there were no palpable thyroid nodules [Heart Rate And Rhythm] : heart rate and rhythm were normal [Heart Sounds] : normal S1 and S2 [Murmurs] : no murmurs present [Respiration, Rhythm And Depth] : normal respiratory rhythm and effort [Exaggerated Use Of Accessory Muscles For Inspiration] : no accessory muscle use [Auscultation Breath Sounds / Voice Sounds] : lungs were clear to auscultation bilaterally [Kyphosis] : kyphosis [Abdomen Soft] : soft [Abdomen Tenderness] : non-tender [Abdomen Mass (___ Cm)] : no abdominal mass palpated [Abnormal Walk] : normal gait [Gait - Sufficient For Exercise Testing] : the gait was sufficient for exercise testing [Nail Clubbing] : no clubbing of the fingernails [Cyanosis, Localized] : no localized cyanosis [Petechial Hemorrhages (___cm)] : no petechial hemorrhages [Deep Tendon Reflexes (DTR)] : deep tendon reflexes were 2+ and symmetric [Sensation] : the sensory exam was normal to light touch and pinprick [No Focal Deficits] : no focal deficits [Oriented To Time, Place, And Person] : oriented to person, place, and time [Impaired Insight] : insight and judgment were intact [Affect] : the affect was normal [Skin Color & Pigmentation] : normal skin color and pigmentation [Skin Turgor] : normal skin turgor [] : no rash [FreeTextEntry1] : I:E ratio 1:3; clear

## 2019-02-11 NOTE — PROCEDURE
[FreeTextEntry1] : PFT- spi reveals moderate restrictive dysfunction; FEV1 was 1.53L which is 84% of predicted; normal lung volumes; severely reduced diffusion at 3.9, which is 23% of predicted; normal flow volume loop \par \par She had a CT chest scan performed on 11/27/2018, which showed mediastinal lymphadenopathy. Chronic interstitial lung disease with interstitial fibrosis is noted. Nodularity seen along the minor fissure and posterior right major fissure. R/o sarcoidosis as well as other causes of interstitial fibrosis.

## 2019-02-11 NOTE — ASSESSMENT
[FreeTextEntry1] : Ms. Monzon is a 83 year old male with a history of COPD, ILDz, abnormal CT (?inflammatory disease vs malignant disease), allergies, former smoker who now comes in for pulmonary pre op clearance.\par \par Her shortness of breath is multifactorial due to:\par -poor mechanics of breathing (poor balance)\par -out of shape / overweight\par -pulmonary disease\par -cardiac disease \par \par problem 1: COPD \par -due to long term smoking history \par -add Anoro1 puff QD \par \par -COPD is a progressive disease and although it can’t be cured , appropriate management can slow its progression, reduce frequency and severity of exacerbations, and improve symptoms and the patient quality of life. Hospitalizations are the greatest contributor to the total COPD costs and account for up to 87% of total COPD related costs. Exacerbations are the main cause of admissions and subsequently account for the 40-75% of COPD costs. Inhaled maintenance therapy reduces the incidence of exacerbations in patients with stable COPD. Incorrect inhaler use and nonadherence are major obstacles to achieving COPD treatment goals. Many COPD patients have challenges (impaired inhalation, limited dexterity, reduced cognition: that limit their ability to correctly use their COPD treatment devices resulting in reduced symptom control. Of most importance is smoking cessation and early intervention with respiratory illnesses and contemplation for pulmonary rehab to enhance quality of life.\par \par problem 2: ? Interstitial Lung Disease/ [place]enop[place]y (?inflammatory/ infectious)\par -S/p High resolution CAT scan of chest (next CT in 3 months as per Dr. Tushar Leiws)\par -no medications needed at this time\par -not currently interested in getting supplemental oxygen \par \par Etiology will depend on the causative agent possibilities include: idiopathic pulmonary fibrosis (UIP), NSIP (nonspecific interstitial pneumonia) , respiratory bronchiolitis in someone who is a smoker, drug induced lung disease, hypersensitivity pneumonitis, BOOP, sarcoidosis, chronic congestive heart failure, rheumatologic disorder induced interstitial lung disease. Optimal diagnosing will include rheumatological panel which would include ESR, TOYA, ANCA, ARNP, CCP, rheumatoid factor, hypersensitivity panel, JOE1, scleroderma antibodies, Sjogren syndrome antibodies; biopsy will be necessary to definitively determine the etiology unless the CT scan findings are specific for UIP. Therapy will be based on diagnostics. \par \par problem 3: PND Syndrome\par - Continue Olopatadine 0.6% 1 sniff BID \par Environmental measures for allergies were encouraged including mattress and pillow cover, air purifier, and environmental controls \par \par problem 4: poor balance\par -recommended to do balance therapy following her knee operation\par \par problem 5: r/o ?OSAS\par -recommended to get Home sleep study \par -can wait until after knee operation\par \par Sleep apnea is associated with adverse clinical consequences which an affect most organ systems. Cardiovascular disease risk includes arrhythmias, atrial fibrillation, hypertension, coronary artery disease, and stroke. Metabolic disorders include diabetes type 2, non-alcoholic fatty liver disease. Mood disorder especially depression; and cognitive decline especially in the elderly. Associations with chronic reflux/Chen’s esophagus some but not all inclusive. \par -Reasons include arousal consistent with hypopnea; respiratory events most prominent in REM sleep or supine position; therefore sleep staging and body position are important for accurate diagnosis and estimation of AHI.\par -Good sleep hygiene was encouraged including avoiding watching television an hour before bed, keeping caffeine at a low, avoiding reading, television, or anything, in bed, no drinking any liquids three hours before bedtime, and only getting into bed when tired and ready for sleep. \par \par problem 6: cardiac disease\par -2/6 systolic murmur noted\par - S/p Echocardiogram ([facility])\par -recommended to continue to follow up with Cardiologist \par \par problem 7: poor breathing mechanics\par -Proper breathing techniques were reviewed with an emphasis of exhalation. Patient instructed to breath in for 1 second and out for four seconds. Patient was encouraged to not talk while walking. \par \par problem 8: over weight / out of shape\par -Weight loss, exercise, and diet control were discussed and are highly encouraged. Treatment options were given such as, aqua therapy, and contacting a nutritionist. Recommended to use the elliptical, stationary bike, less use of treadmill. Mindful eating was explained to the patient Obesity is associated with worsening asthma, shortness of breath, and potential for cardiac disease, diabetes, and other underlying medical conditions\par \par problem 9: pre-op pulmonary clearance for knee surgery and lung surgery. \par -will need supplemental oxygen (3-5 liters) at time of surgery \par -at this point in time there are no absolute pulmonary contraindications to go forward with the planned procedure \par -at the time of surgery s/he should have optimal pain control, incentive spirometry, early ambulation, DVT and GI prophylaxis. \par \par problem 10: health maintenance \par -recommended yearly flu shot after October 15\par -recommended strep pneumonia vaccines: Prevnar-13 vaccine, followed by Pneumo vaccine 23 one year following\par -recommended early intervention for Upper Respiratory Infections (URIs)\par -recommended regular osteoporosis evaluations\par -recommended early dermatological evaluations\par -recommended after the age of 50 to the age of 70, colonoscopy every 5 years\par \par F/U in 4 months \par He She is encouraged to call with any changes, concerns, or questions

## 2019-02-11 NOTE — HISTORY OF PRESENT ILLNESS
[FreeTextEntry1] : Ms. Monzon is a 83 year old with a history of abnormal CT, COPD, chronic respiratory failure with hypoxia, ILDz, PND, poor balance, PAH, snoring, and SOB presenting to the office today for a follow up visit and pulmonary preop clearance for right knee surgery.  Her chief complaint is potential lung biopsy. \par - She comes in highly hesitant regarding her potential lung biopsy. She has expressed her concern regarding the procedure and is very apprehensive about having it done.  \par - She recently followed up with Dr. Lewis, who rescheduled her lung biopsy to this Friday (from next Thursday)\par - Her daughter is concerned given that she has been described as a high-risk patient. \par - Her daughter has states that she will not have the surgery performed on Friday and feels that they are being pushed through this process. \par - She states that if she eats certain foods, it will upset her stomach\par - Her sinuses are generally well\par - she states that she becomes SOB while in the cold and climbing stairs. \par - She has been having a number of issues with obtaining her inhalers. \par -She denies any headaches, nausea, vomiting, fever, chills, sweats, chest pain, chest pressure, palpitations, coughing, wheezing, fatigue, diarrhea, constipation, dysphagia, myalgias, dizziness, leg swelling, leg pain, itchy eyes, itchy ears, heartburn, reflux, or sour taste in the mouth.

## 2019-02-11 NOTE — ADDENDUM
[FreeTextEntry1] : Documented by Shay Neumann acting as a scribe for Dr. Zhao Horowitz on 2/11/2019\par \par All medical record entries made by the Scribe were at my, Dr. Zhao Horowitz's, direction and personally dictated by me on 2/11/2019. I have reviewed the chart and agree that the record accurately reflects my personal performance of the history, physical exam, assessment and plan. I have also personally directed, reviewed, and agree with the discharge instructions. \par \par \par \par \par

## 2019-02-12 PROBLEM — M06.9 RHEUMATOID ARTHRITIS, UNSPECIFIED: Chronic | Status: ACTIVE | Noted: 2019-02-07

## 2019-02-12 PROBLEM — R91.1 SOLITARY PULMONARY NODULE: Chronic | Status: ACTIVE | Noted: 2019-02-07

## 2019-02-12 PROBLEM — M19.90 UNSPECIFIED OSTEOARTHRITIS, UNSPECIFIED SITE: Chronic | Status: ACTIVE | Noted: 2019-02-07

## 2019-02-20 ENCOUNTER — OTHER (OUTPATIENT)
Age: 84
End: 2019-02-20

## 2019-02-20 DIAGNOSIS — R92.8 OTHER ABNORMAL AND INCONCLUSIVE FINDINGS ON DIAGNOSTIC IMAGING OF BREAST: ICD-10-CM

## 2019-02-21 ENCOUNTER — APPOINTMENT (OUTPATIENT)
Dept: THORACIC SURGERY | Facility: HOSPITAL | Age: 84
End: 2019-02-21

## 2019-03-01 ENCOUNTER — APPOINTMENT (OUTPATIENT)
Dept: THORACIC SURGERY | Facility: HOSPITAL | Age: 84
End: 2019-03-01

## 2019-03-11 ENCOUNTER — APPOINTMENT (OUTPATIENT)
Dept: FAMILY MEDICINE | Facility: CLINIC | Age: 84
End: 2019-03-11
Payer: MEDICARE

## 2019-03-11 DIAGNOSIS — Z00.00 ENCOUNTER FOR GENERAL ADULT MEDICAL EXAMINATION W/OUT ABNORMAL FINDINGS: ICD-10-CM

## 2019-03-11 DIAGNOSIS — Z01.818 ENCOUNTER FOR OTHER PREPROCEDURAL EXAMINATION: ICD-10-CM

## 2019-03-11 PROCEDURE — 36415 COLL VENOUS BLD VENIPUNCTURE: CPT

## 2019-03-11 PROCEDURE — 99214 OFFICE O/P EST MOD 30 MIN: CPT | Mod: 25

## 2019-03-12 LAB
ALBUMIN SERPL ELPH-MCNC: 4.3 G/DL
ALP BLD-CCNC: 124 U/L
ALT SERPL-CCNC: 13 U/L
ANION GAP SERPL CALC-SCNC: 13 MMOL/L
AST SERPL-CCNC: 23 U/L
BASOPHILS # BLD AUTO: 0.07 K/UL
BASOPHILS NFR BLD AUTO: 0.5 %
BILIRUB SERPL-MCNC: 0.4 MG/DL
BUN SERPL-MCNC: 26 MG/DL
CALCIUM SERPL-MCNC: 9.3 MG/DL
CHLORIDE SERPL-SCNC: 102 MMOL/L
CO2 SERPL-SCNC: 22 MMOL/L
CREAT SERPL-MCNC: 1.43 MG/DL
EOSINOPHIL # BLD AUTO: 0.35 K/UL
EOSINOPHIL NFR BLD AUTO: 2.7 %
GLUCOSE SERPL-MCNC: 93 MG/DL
HCT VFR BLD CALC: 43.9 %
HGB BLD-MCNC: 13.4 G/DL
IMM GRANULOCYTES NFR BLD AUTO: 0.3 %
LYMPHOCYTES # BLD AUTO: 2.78 K/UL
LYMPHOCYTES NFR BLD AUTO: 21.5 %
MAN DIFF?: NORMAL
MCHC RBC-ENTMCNC: 26.8 PG
MCHC RBC-ENTMCNC: 30.5 GM/DL
MCV RBC AUTO: 87.8 FL
MONOCYTES # BLD AUTO: 0.74 K/UL
MONOCYTES NFR BLD AUTO: 5.7 %
NEUTROPHILS # BLD AUTO: 8.98 K/UL
NEUTROPHILS NFR BLD AUTO: 69.3 %
PLATELET # BLD AUTO: 354 K/UL
POTASSIUM SERPL-SCNC: 4.5 MMOL/L
PROT SERPL-MCNC: 7.6 G/DL
RBC # BLD: 5 M/UL
RBC # FLD: 15.4 %
SODIUM SERPL-SCNC: 137 MMOL/L
WBC # FLD AUTO: 12.96 K/UL

## 2019-03-19 ENCOUNTER — RESULT REVIEW (OUTPATIENT)
Age: 84
End: 2019-03-19

## 2019-03-27 ENCOUNTER — RESULT REVIEW (OUTPATIENT)
Age: 84
End: 2019-03-27

## 2019-03-28 ENCOUNTER — RESULT REVIEW (OUTPATIENT)
Age: 84
End: 2019-03-28

## 2019-05-17 ENCOUNTER — APPOINTMENT (OUTPATIENT)
Dept: ORTHOPEDIC SURGERY | Facility: CLINIC | Age: 84
End: 2019-05-17
Payer: MEDICARE

## 2019-05-17 DIAGNOSIS — M17.11 UNILATERAL PRIMARY OSTEOARTHRITIS, RIGHT KNEE: ICD-10-CM

## 2019-05-17 DIAGNOSIS — Z96.652 PRESENCE OF LEFT ARTIFICIAL KNEE JOINT: ICD-10-CM

## 2019-05-17 DIAGNOSIS — M17.0 BILATERAL PRIMARY OSTEOARTHRITIS OF KNEE: ICD-10-CM

## 2019-05-17 DIAGNOSIS — M21.161 VARUS DEFORMITY, NOT ELSEWHERE CLASSIFIED, RIGHT KNEE: ICD-10-CM

## 2019-05-17 DIAGNOSIS — M25.561 PAIN IN RIGHT KNEE: ICD-10-CM

## 2019-05-17 PROCEDURE — 73564 X-RAY EXAM KNEE 4 OR MORE: CPT | Mod: RT

## 2019-05-17 PROCEDURE — 73562 X-RAY EXAM OF KNEE 3: CPT | Mod: LT

## 2019-05-17 PROCEDURE — 99214 OFFICE O/P EST MOD 30 MIN: CPT | Mod: 25

## 2019-05-17 PROCEDURE — 20610 DRAIN/INJ JOINT/BURSA W/O US: CPT | Mod: RT

## 2019-05-17 NOTE — HISTORY OF PRESENT ILLNESS
[All Other ROS Normal] : All other review of systems are negative except as noted [Joint Pain] : joint pain [FreeTextEntry1] : bilateral knee pain  [FreeTextEntry2] : 82 y/o female presents for follow up evaluation s/p left TKR at 1.5 years. Patient is doing well and has no complaints. Regarding her right knee pain, she has degenerative arthritis. She has been undergoing nonoperative treatment with viscosupplementation and cortisone injections, last done in September 2018 with a good response. She states since last week she has had increased pain and would like another cortisone injection. She has also been using Diclofenac with improvement, and would like a new prescription. She would like to hold off on scheduling a right TKR until Summer 2019.

## 2019-05-17 NOTE — DISCUSSION/SUMMARY
[de-identified] : Patient is doing well following their left TKR at 1.5 years.. They will continue activities as tolerated. Regarding her right knee, her symptoms appear secondary to degenerative arthritis with varus alignment, which is symptomatic at this time. While I believe she would eventually benefit from a right TKR, she would like to wait until Summer 2019 to schedule surgery. Therefore, patient was given a right knee cortisone injection today as detailed above for symptomatic relief. She may continue with Diclofenac and home exercise. Patient may follow up with x-rays in 3 months.

## 2019-05-17 NOTE — PHYSICAL EXAM
[de-identified] : gait: right antalgic gait with lateral thrust \par \par Left Knee\par Inspection: no effusion or erythema.\par Wounds: healed midline incision \par Alignment: normal.\par Palpation: no specific tenderness on palpation.\par ROM: Active (in degrees) 0-120\par Ligamentous laxity (neg): all ligaments appear stable, negative ant. drawer test, negative post. drawer test, stable to varus stress test, stable to valgus stress test,\par Patellofemoral Alignment Test: Q angle-, normal.\par Muscle Test: good quad strength.\par Leg examination: calf is soft and non-tender.\par \par Right knee\par Inspection: no effusion or erythema.\par Wounds: none.\par Alignment: 10 degrees varus alignment \par Palpation: medial tenderness on palpation.\par ROM: Active (in degrees) 0-120 with pain and crepitus \par Ligamentous laxity (neg): all ligaments appear stable, negative ant. drawer test, negative post. drawer test, stable to varus stress test, stable to valgus stress test, negative Lachman's test, negative pivot shift test,\par Meniscal Test: negative McMurrays, negative Kurt.\par Patellofemoral Alignment Test: Q angle-, normal.\par Muscle Test: good quad strength.\par Leg examination: calf is soft and non-tender [de-identified] : Right knee xrays, standing AP/Lateral, Merchant films, and 45 degree PA standing view taken at the office today show:, diffuse tricompartmental degenerative arthritis, medial joint space narrowing, marginal osteophytes, sclerosis, patellofemoral joint space narrowing, peripheral osteophytes, bone on bone, Kellgren and Hayder grade 4, varus deformity \par  \par Left knee AP, lateral, merchant view demonstrates a total knee replacement in satisfactory position and alignment. No evidence of loosening. The patella sits in a central position.

## 2019-05-17 NOTE — PROCEDURE
[de-identified] : RIGHT KNEE CORTISONE INJECTION\par Discussed at length with the patient the planned steroid and lidocaine injection. The risks, benefits, convalescence and alternatives were reviewed. The possible side effects discussed included but were not limited to: pain, swelling, heat and redness. There symptoms are generally mild but if they are extensive then contact the office. Giving pain relievers by mouth such as NSAID’s or Tylenol can generally treat the reactions to  steroid and lidocaine. Rare cases of infection have been noted. Rash, hives and itching may occur post injection. If you have muscle pain or cramps, flushing and or swelling of the face, rapid heart beat, nausea, dizziness, fever, chills, headache, difficulty breathing, swelling in the arms or legs, or have a prickly feeling of your skin, contact a health care provider immediately.\par  \par Following this discussion, the knee was prepped with betadine and under sterile conditions 9 cc of 1% lidocaine and 1 cc (40 mg) of Depo-Medrol were injected with a 21 gauge needle. The needle was introduced into the joint, aspiration was performed to ensure intra-articular placement and the medication was injected. Upon withdrawal of the needle the site was cleaned with alcohol and a bandaid applied. The patient tolerated the injection well and there were no adverse effects. Post injection instructions included no strenuous activity for 24 hours, cryotherapy and if there are any adverse effects to contact the office.

## 2019-05-17 NOTE — HISTORY OF PRESENT ILLNESS
[de-identified] : 83 year old female presents for follow up evaluation s/p left TKR at 2 years and right arthritic knee. She states her left knee is doing well and has no complaints. She has been undergoing nonoperative treatment with cortisone injections for her right knee. She states they have been increasingly less effective, and she currently has right knee pain on a daily basis. She would eventually like to schedule a right TKR, but is currently under care for breast CA and is pending radiation therapy. At this time she would like another right knee cortisone injection for symptomatic relief.  She has been using Diclofenac and trying to stay active with home exercise.

## 2019-05-17 NOTE — ADDENDUM
[FreeTextEntry1] : This note was written by Faiza Crews on 01/16/2019 acting as scribe for Dr. Paul Basurto M.D.\par \par I, Dr. Paul Basurto M.D., have read and attest that all the information, medical decision making and discharge instructions within are true and accurate.\par

## 2019-05-17 NOTE — DISCUSSION/SUMMARY
[de-identified] : Patient is doing well following their left TKR at 2 years. They will continue activities as tolerated. Her right knee continues to be symptomatic due to degenerative arthritis with varus alignment.  While I believe she would eventually benefit from a right TKR, she is currently under treatment for breast CA and is pending radiation therapy. Therefore, patient was given a right knee cortisone injection today as detailed above for symptomatic relief. She may continue with Diclofenac, home exercise, and we have provided a prescription for PT. Patient may follow up with x-rays in 2-3 months at which point she has hopefully completed her treatment for breast CA and we can discuss scheduling a right TKR.

## 2019-05-17 NOTE — PHYSICAL EXAM
[FreeTextEntry2] : Left Knee\par Inspection: no effusion or erythema.\par Wounds: healed midline incision \par Alignment: normal.\par Palpation: no specific tenderness on palpation.\par ROM: Active (in degrees) 0-120\par Ligamentous laxity (neg): all ligaments appear stable, negative ant. drawer test, negative post. drawer test, stable to varus stress test, stable to valgus stress test,\par Patellofemoral Alignment Test: Q angle-, normal.\par Muscle Test: good quad strength.\par Leg examination: calf is soft and non-tender.\par \par Right knee\par Inspection: no effusion or erythema.\par Wounds: none.\par Alignment: 10 degrees varus alignment \par Palpation: medial tenderness on palpation.\par ROM: Active (in degrees) 0-120 with pain and crepitus \par Ligamentous laxity (neg): all ligaments appear stable, negative ant. drawer test, negative post. drawer test, stable to varus stress test, stable to valgus stress test, negative Lachman's test, negative pivot shift test,\par Meniscal Test: negative McMurrays, negative Kurt.\par Patellofemoral Alignment Test: Q angle-, normal.\par Muscle Test: good quad strength.\par Leg examination: calf is soft and non-tender.\par   [de-identified] : Right knee xrays, standing AP/Lateral, Merchant films, and 45 degree PA standing view taken at the office today show:, diffuse tricompartmental degenerative arthritis, medial joint space narrowing, marginal osteophytes, sclerosis, patellofemoral joint space narrowing, peripheral osteophytes, bone on bone, Kellgren and Hayder grade 4\par  \par Left knee AP, lateral, merchant view demonstrates a total knee replacement in satisfactory position and alignment. No evidence of loosening. The patella sits in a central position.

## 2019-05-17 NOTE — ADDENDUM
[FreeTextEntry1] : This note was written by Faiza Crews on 05/17/2019 acting as scribe for Dr. Paul Basurto M.D.\par \par I, Dr. Paul Basurto M.D., have read and attest that all the information, medical decision making and discharge instructions within are true and accurate.\par

## 2019-05-17 NOTE — PROCEDURE
[de-identified] : Discussed at length with the patient the planned steroid and lidocaine injection. The risks, benefits, convalescence and alternatives were reviewed. The possible side effects discussed included but were not limited to: pain, swelling, heat and redness. There symptoms are generally mild but if they are extensive then contact the office. Giving pain relievers by mouth such as NSAID’s or Tylenol can generally treat the reactions to  steroid and lidocaine. Rare cases of infection have been noted. Rash, hives and itching may occur post injection. If you have muscle pain or cramps, flushing and or swelling of the face, rapid heart beat, nausea, dizziness, fever, chills, headache, difficulty breathing, swelling in the arms or legs, or have a prickly feeling of your skin, contact a health care provider immediately.\par  \par Following this discussion, the RIGHT KNEE was prepped with betadine and under sterile conditions 9 cc of 1% lidocaine and 1 cc (40 mg) of Depo-Medrol were injected with a 21 gauge needle. The needle was introduced into the joint, aspiration was performed to ensure intra-articular placement and the medication was injected. Upon withdrawal of the needle the site was cleaned with alcohol and a bandaid applied. The patient tolerated the injection well and there were no adverse effects. Post injection instructions included no strenuous activity for 24 hours, cryotherapy and if there are any adverse effects to contact the office.\par

## 2019-07-03 NOTE — OCCUPATIONAL THERAPY INITIAL EVALUATION ADULT - PAIN SENSATION, RUE, REHAB EVAL
GEN: Pt in NAD, A&O x3.  PSYCH: Affect appropriate.  EYES: Sclera white w/o injection.   ENT: Head NCAT. Nose w/o deformity. No auricular TTP. MMM. Neck supple FROM.  RESP: No chest wall tenderness, CTA b/l, no wheezes, rales, or rhonchi.   CARDIAC: RRR, clear distinct S1, S2, no appreciable murmurs.  ABD: Abdomen moderately distended, soft, ttp RLQ>RUQ. No CVAT b/l.  VASC: 2+ radial and dorsalis pedis pulses b/l. No edema or calf tenderness.  SKIN: No rashes on the trunk. within normal limits

## 2019-09-12 ENCOUNTER — MEDICATION RENEWAL (OUTPATIENT)
Age: 84
End: 2019-09-12

## 2019-09-16 ENCOUNTER — APPOINTMENT (OUTPATIENT)
Dept: FAMILY MEDICINE | Facility: CLINIC | Age: 84
End: 2019-09-16
Payer: MEDICARE

## 2019-09-16 VITALS
SYSTOLIC BLOOD PRESSURE: 114 MMHG | HEIGHT: 60 IN | OXYGEN SATURATION: 67 % | HEART RATE: 100 BPM | DIASTOLIC BLOOD PRESSURE: 82 MMHG

## 2019-09-16 DIAGNOSIS — J44.9 CHRONIC OBSTRUCTIVE PULMONARY DISEASE, UNSPECIFIED: ICD-10-CM

## 2019-09-16 DIAGNOSIS — R53.81 OTHER MALAISE: ICD-10-CM

## 2019-09-16 PROCEDURE — 99214 OFFICE O/P EST MOD 30 MIN: CPT | Mod: 25

## 2019-09-16 PROCEDURE — 36415 COLL VENOUS BLD VENIPUNCTURE: CPT

## 2019-09-17 LAB
ALBUMIN SERPL ELPH-MCNC: 4 G/DL
ALP BLD-CCNC: 107 U/L
ALT SERPL-CCNC: 17 U/L
ANION GAP SERPL CALC-SCNC: 13 MMOL/L
AST SERPL-CCNC: 21 U/L
BASOPHILS # BLD AUTO: 0.05 K/UL
BASOPHILS NFR BLD AUTO: 0.5 %
BILIRUB SERPL-MCNC: 1.1 MG/DL
BUN SERPL-MCNC: 29 MG/DL
CALCIUM SERPL-MCNC: 9.4 MG/DL
CHLORIDE SERPL-SCNC: 99 MMOL/L
CHOLEST SERPL-MCNC: 135 MG/DL
CHOLEST/HDLC SERPL: 3.7 RATIO
CO2 SERPL-SCNC: 23 MMOL/L
CREAT SERPL-MCNC: 1.54 MG/DL
EOSINOPHIL # BLD AUTO: 0.12 K/UL
EOSINOPHIL NFR BLD AUTO: 1.3 %
GLUCOSE SERPL-MCNC: 140 MG/DL
HCT VFR BLD CALC: 45.9 %
HDLC SERPL-MCNC: 37 MG/DL
HGB BLD-MCNC: 13.5 G/DL
IMM GRANULOCYTES NFR BLD AUTO: 0.3 %
LDLC SERPL CALC-MCNC: 80 MG/DL
LYMPHOCYTES # BLD AUTO: 1.36 K/UL
LYMPHOCYTES NFR BLD AUTO: 14.2 %
MAN DIFF?: NORMAL
MCHC RBC-ENTMCNC: 24.1 PG
MCHC RBC-ENTMCNC: 29.4 GM/DL
MCV RBC AUTO: 81.8 FL
MONOCYTES # BLD AUTO: 0.58 K/UL
MONOCYTES NFR BLD AUTO: 6 %
NEUTROPHILS # BLD AUTO: 7.45 K/UL
NEUTROPHILS NFR BLD AUTO: 77.7 %
PLATELET # BLD AUTO: 256 K/UL
POTASSIUM SERPL-SCNC: 4.2 MMOL/L
PROT SERPL-MCNC: 6.3 G/DL
RBC # BLD: 5.61 M/UL
RBC # FLD: 17 %
SODIUM SERPL-SCNC: 135 MMOL/L
TRIGL SERPL-MCNC: 91 MG/DL
TSH SERPL-ACNC: 3.89 UIU/ML
WBC # FLD AUTO: 9.59 K/UL

## 2019-12-02 ENCOUNTER — OTHER (OUTPATIENT)
Age: 84
End: 2019-12-02

## 2019-12-05 LAB
ANION GAP SERPL CALC-SCNC: 14 MMOL/L
BUN SERPL-MCNC: 32 MG/DL
CALCIUM SERPL-MCNC: 8.8 MG/DL
CHLORIDE SERPL-SCNC: 99 MMOL/L
CO2 SERPL-SCNC: 28 MMOL/L
CREAT SERPL-MCNC: 1.33 MG/DL
GLUCOSE SERPL-MCNC: 69 MG/DL
POTASSIUM SERPL-SCNC: 4.1 MMOL/L
SODIUM SERPL-SCNC: 141 MMOL/L

## 2019-12-16 ENCOUNTER — APPOINTMENT (OUTPATIENT)
Dept: CARDIOLOGY | Facility: CLINIC | Age: 84
End: 2019-12-16
Payer: MEDICARE

## 2019-12-16 ENCOUNTER — APPOINTMENT (OUTPATIENT)
Dept: INTERNAL MEDICINE | Facility: CLINIC | Age: 84
End: 2019-12-16
Payer: MEDICARE

## 2019-12-16 VITALS
SYSTOLIC BLOOD PRESSURE: 96 MMHG | OXYGEN SATURATION: 73 % | DIASTOLIC BLOOD PRESSURE: 68 MMHG | HEIGHT: 60 IN | HEART RATE: 87 BPM | BODY MASS INDEX: 26.31 KG/M2 | WEIGHT: 134 LBS

## 2019-12-16 DIAGNOSIS — E87.70 FLUID OVERLOAD, UNSPECIFIED: ICD-10-CM

## 2019-12-16 DIAGNOSIS — R06.02 SHORTNESS OF BREATH: ICD-10-CM

## 2019-12-16 DIAGNOSIS — I10 ESSENTIAL (PRIMARY) HYPERTENSION: ICD-10-CM

## 2019-12-16 PROCEDURE — 93306 TTE W/DOPPLER COMPLETE: CPT

## 2019-12-16 PROCEDURE — 99214 OFFICE O/P EST MOD 30 MIN: CPT

## 2019-12-16 RX ORDER — LOSARTAN POTASSIUM AND HYDROCHLOROTHIAZIDE 25; 100 MG/1; MG/1
100-25 TABLET ORAL
Qty: 90 | Refills: 1 | Status: DISCONTINUED | COMMUNITY
Start: 2016-05-25 | End: 2019-12-16

## 2019-12-16 NOTE — HISTORY OF PRESENT ILLNESS
[FreeTextEntry1] : 83F with HTN, COPD/ILD, breast Ca s/p RT in 2019, who presents for follow up of shortness of breath, cough. \par \par Breathing has worsened.  Put on lasix 20mg for noted elevation in pBNP, crt came down, mild improvement of symptoms. Still wearing O2.  \par Initially seen in late 2018 with worsening of shortness of breath, had been evaluated with an echo at the time which was RV dysfunction with mild DD, s/p RHC showing mod pHTN.  Since then, was noted with breast Ca and underwent lumpectomy and multiple RT treatments. \par Currently seeing Dr. Gilmore, pulmonology\par \par Worked for a bank. Former smoker (quit 15 years ago). Brother just had CABG at 74. \par \par ECG: SR with BRODIE, PRWP, LAFB\par TTE: 12/2018: Normal LV function, mild DD, mildly decreased RV function, Moderate pHTN (60 mmHg), mild MR\par RHC: PASP 50, PCWP 19\par \par Losartan 100-HCTZ 12.5, Lasix 20mg

## 2019-12-16 NOTE — DISCUSSION/SUMMARY
[FreeTextEntry1] : 83F with HTN, underlying COPD/ILD, RV dysfunction with pHTN, with progressive SOB, likely secondary to underlying COPD/ILD, underlying diastolic dysfunction.  \par \par She did have some evidence of fluid overload based on blood work, and seems to be responding well to lasix. Would continue\par \par She is borderline hypotensive and is also on an ARB/HCTZ combo, would stop and favor diuretics to manage BP at this point\par \par Check echo to ensure continued normal LV function, valvular function \par \par RV 3 months

## 2019-12-16 NOTE — REVIEW OF SYSTEMS
[Shortness Of Breath] : shortness of breath [Chest Pain] : no chest pain [Palpitations] : no palpitations [Negative] : Neurological

## 2019-12-16 NOTE — PHYSICAL EXAM
[General Appearance - Well Developed] : well developed [Normal Appearance] : normal appearance [Well Groomed] : well groomed [General Appearance - Well Nourished] : well nourished [No Deformities] : no deformities [General Appearance - In No Acute Distress] : no acute distress [Normal Conjunctiva] : the conjunctiva exhibited no abnormalities [Eyelids - No Xanthelasma] : the eyelids demonstrated no xanthelasmas [Normal Oral Mucosa] : normal oral mucosa [No Oral Pallor] : no oral pallor [No Oral Cyanosis] : no oral cyanosis [Respiration, Rhythm And Depth] : normal respiratory rhythm and effort [Auscultation Breath Sounds / Voice Sounds] : lungs were clear to auscultation bilaterally [Chest Palpation] : palpation of the chest revealed no abnormalities [Lungs Percussion] : the lungs were normal to percussion [Heart Rate And Rhythm] : heart rate and rhythm were normal [FreeTextEntry1] : coarse breath sounds [Murmurs] : no murmurs present [Heart Sounds] : normal S1 and S2 [Abdomen Soft] : soft [Edema] : no peripheral edema present [Abdomen Tenderness] : non-tender [Abdomen Mass (___ Cm)] : no abdominal mass palpated [Gait - Sufficient For Exercise Testing] : the gait was sufficient for exercise testing [Nail Clubbing] : no clubbing of the fingernails [Cyanosis, Localized] : no localized cyanosis [Abnormal Walk] : normal gait [Petechial Hemorrhages (___cm)] : no petechial hemorrhages [Skin Color & Pigmentation] : normal skin color and pigmentation [] : no rash [No Venous Stasis] : no venous stasis [Skin Lesions] : no skin lesions [No Skin Ulcers] : no skin ulcer [No Xanthoma] : no  xanthoma was observed [Affect] : the affect was normal [Oriented To Time, Place, And Person] : oriented to person, place, and time [Mood] : the mood was normal [No Anxiety] : not feeling anxious

## 2019-12-17 DIAGNOSIS — E87.6 HYPOKALEMIA: ICD-10-CM

## 2019-12-17 LAB
ANION GAP SERPL CALC-SCNC: 17 MMOL/L
BUN SERPL-MCNC: 29 MG/DL
CALCIUM SERPL-MCNC: 9.5 MG/DL
CHLORIDE SERPL-SCNC: 95 MMOL/L
CO2 SERPL-SCNC: 26 MMOL/L
CREAT SERPL-MCNC: 1.31 MG/DL
GLUCOSE SERPL-MCNC: 106 MG/DL
NT-PROBNP SERPL-MCNC: 9458 PG/ML
POTASSIUM SERPL-SCNC: 3.3 MMOL/L
SODIUM SERPL-SCNC: 138 MMOL/L

## 2020-01-09 ENCOUNTER — MEDICATION RENEWAL (OUTPATIENT)
Age: 85
End: 2020-01-09

## 2020-01-09 RX ORDER — FUROSEMIDE 20 MG/1
20 TABLET ORAL DAILY
Qty: 30 | Refills: 1 | Status: ACTIVE | COMMUNITY
Start: 2019-11-13 | End: 1900-01-01

## 2020-02-17 ENCOUNTER — RX RENEWAL (OUTPATIENT)
Age: 85
End: 2020-02-17

## 2020-02-17 RX ORDER — POTASSIUM CHLORIDE 750 MG/1
10 TABLET, FILM COATED, EXTENDED RELEASE ORAL DAILY
Qty: 30 | Refills: 2 | Status: ACTIVE | COMMUNITY
Start: 2019-12-17 | End: 1900-01-01

## 2020-03-12 ENCOUNTER — APPOINTMENT (OUTPATIENT)
Dept: CARDIOLOGY | Facility: CLINIC | Age: 85
End: 2020-03-12

## 2020-03-12 ENCOUNTER — APPOINTMENT (OUTPATIENT)
Dept: INTERNAL MEDICINE | Facility: CLINIC | Age: 85
End: 2020-03-12

## 2022-03-04 NOTE — H&P PST ADULT - PAIN SCALE PREFERRED, PROFILE
Called to bedside due to patient wanting to leave AMA. Reports she isn't getting her pain meds like she was at home. She received tramadol prior to my arrival and states it is helping. She feels we aren't doing anything about her low blood levels but refused to let lab draw her. Explained that we need to see what her levels are this morning. She does not want to be stuck for blood. Offered to give dose of morphine if still in pain. Patient calling for ride to see if they are coming. She states if she has a ride coming she wants to leave and she will let me know. Nurse instructed to let the team know what the patient has decided and whether she has a ride or not.    numerical 0-10

## 2022-07-08 NOTE — H&P PST ADULT - HEMATOLOGY/LYMPHATICS
[Fatigue] : fatigue [Recent Wt Gain (___ Lbs)] : ~T recent [unfilled] lb weight gain [Poor Appetite] : poor appetite [Epistaxis] : epistaxis [Dyspnea] : dyspnea [SOB on Exertion] : sob on exertion [GERD] : gerd [Anxiety] : anxiety [Obesity] : obesity [Fever] : no fever [Chills] : no chills [Cough] : no cough [Hemoptysis] : no hemoptysis [Sputum] : no sputum [Wheezing] : no wheezing [Abdominal Pain] : no abdominal pain [Nausea] : no nausea [Vomiting] : no vomiting [Constipation] : no constipation [Dysphagia] : no dysphagia [Bleeding] : no bleeding [Myalgias] : no myalgias [Back Pain] : no back pain [Chronic Pain] : no chronic pain negative [Rash] : no rash [Blood Transfusion] : no blood transfusion [Clotting Disorder/ Frequent bleeding] : no clotting disorder/ frequent bleeding [Depression] : no depression [Panic Attacks] : no panic attacks [Diabetes] : no diabetes [Thyroid Problem] : no thyroid problem [TextBox_[id number]] : improved with nexium

## 2022-10-27 NOTE — ASU PREOP CHECKLIST - HEIGHT IN INCHES
1 Eucrisa Counseling: Patient may experience a mild burning sensation during topical application. Eucrisa is not approved in children less than 3 months of age.

## 2023-05-02 NOTE — OCCUPATIONAL THERAPY INITIAL EVALUATION ADULT - PRECAUTIONS/LIMITATIONS, REHAB EVAL
no known precautions/limitations Aerobic Loss, Impaired Mobility, Prevention From Hospital Acquired Gross Deconditioning

## 2024-05-15 NOTE — H&P PST ADULT - ENERGY EXPENDITURE (METS)
You were seen in the ED for diverticulitis. Return to the ED for any worsening symptoms or new symptoms. Follow up with your primary care doctor as soon as possible.     >4

## 2024-08-21 NOTE — DISCHARGE NOTE ADULT - NSCORESITESY/N_GEN_A_CORE_RD
Is This A New Presentation, Or A Follow-Up?: Skin Lesions
How Severe Is Your Skin Lesion?: moderate
Have Your Skin Lesions Been Treated?: not been treated
Yes